# Patient Record
Sex: FEMALE | HISPANIC OR LATINO
[De-identification: names, ages, dates, MRNs, and addresses within clinical notes are randomized per-mention and may not be internally consistent; named-entity substitution may affect disease eponyms.]

---
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
## 2020-05-21 ENCOUNTER — TELEMEDICINE (OUTPATIENT)
Dept: FAMILY MEDICINE CLINIC | Facility: CLINIC | Age: 20
End: 2020-05-21
Payer: COMMERCIAL

## 2020-05-21 DIAGNOSIS — Z30.09 COUNSELING FOR BIRTH CONTROL, ORAL CONTRACEPTIVES: ICD-10-CM

## 2020-05-21 DIAGNOSIS — Z30.011 ENCOUNTER FOR BCP (BIRTH CONTROL PILLS) INITIAL PRESCRIPTION: Primary | ICD-10-CM

## 2020-05-21 PROCEDURE — 99213 OFFICE O/P EST LOW 20 MIN: CPT | Performed by: EMERGENCY MEDICINE

## 2020-05-21 RX ORDER — DROSPIRENONE AND ETHINYL ESTRADIOL 0.02-3(28)
1 KIT ORAL DAILY
Qty: 3 PACKAGE | Refills: 0 | Status: SHIPPED | OUTPATIENT
Start: 2020-05-21 | End: 2020-07-14

## 2020-05-21 NOTE — PROGRESS NOTES
This is a telemedicine visit with live, interactive video and audio. Fidencio Whyte verbally consents to a Virtual/Telephone Check-In visit on 05/21/20.     Patient understands and accepts financial responsibility for any deductible, co-insurance an contraceptives        Patient counseled on use of birth control and she is to avoid smoking. She understands that birth control does not protect her from all STDs.   Discussed risk factors of combined OCP including DVT/stroke, advised not to smoke as it wi

## 2020-05-21 NOTE — PATIENT INSTRUCTIONS
Birth Control Choices  Birth control keeps you from getting pregnant during sex. There are many types of birth control. Some are more effective than others. New types are being tested all the time.  Your healthcare provider can help you decide which typ your healthcare provider about possible risks. Female sterilization can also be done through the belly (laparoscopy) to block the tubes. Or to remove part or all of the tubes. · Withdrawal method. This is when the male doesn't ejaculate into the vagina.  I of birth control. Some are more effective than others. New types are being tested all the time. Your healthcare provider can help you decide which type of birth control is best for you.  But no matter which type you choose, you and your partner must use it (laparoscopy) to block the tubes. Or to remove part or all of the tubes. · Withdrawal method. This is when the male doesn't ejaculate into the vagina. Instead he withdraws his penis just before he ejaculates.  But the failure rate for this method ranges fr

## 2020-07-14 ENCOUNTER — LAB ENCOUNTER (OUTPATIENT)
Dept: LAB | Age: 20
End: 2020-07-14
Attending: NURSE PRACTITIONER
Payer: COMMERCIAL

## 2020-07-14 ENCOUNTER — OFFICE VISIT (OUTPATIENT)
Dept: FAMILY MEDICINE CLINIC | Facility: CLINIC | Age: 20
End: 2020-07-14
Payer: COMMERCIAL

## 2020-07-14 VITALS
BODY MASS INDEX: 20.61 KG/M2 | HEIGHT: 62 IN | OXYGEN SATURATION: 99 % | WEIGHT: 112 LBS | SYSTOLIC BLOOD PRESSURE: 112 MMHG | TEMPERATURE: 98 F | DIASTOLIC BLOOD PRESSURE: 68 MMHG | HEART RATE: 85 BPM | RESPIRATION RATE: 16 BRPM

## 2020-07-14 DIAGNOSIS — Z01.84 IMMUNITY STATUS TESTING: ICD-10-CM

## 2020-07-14 DIAGNOSIS — Z00.00 EXAMINATION, ROUTINE, OVER 18 YEARS OF AGE: Primary | ICD-10-CM

## 2020-07-14 DIAGNOSIS — Z71.3 ENCOUNTER FOR DIETARY COUNSELING AND SURVEILLANCE: ICD-10-CM

## 2020-07-14 DIAGNOSIS — Z30.41 ENCOUNTER FOR BIRTH CONTROL PILLS MAINTENANCE: ICD-10-CM

## 2020-07-14 DIAGNOSIS — Z01.84 IMMUNITY STATUS TESTING: Primary | ICD-10-CM

## 2020-07-14 DIAGNOSIS — Z23 NEED FOR HPV VACCINATION: ICD-10-CM

## 2020-07-14 DIAGNOSIS — Z71.82 EXERCISE COUNSELING: ICD-10-CM

## 2020-07-14 LAB
HBV SURFACE AB SER QL: NONREACTIVE
HBV SURFACE AB SERPL IA-ACNC: <3.1 MIU/ML
RUBV IGG SER QL: POSITIVE
RUBV IGG SER-ACNC: 68.5 IU/ML (ref 10–?)

## 2020-07-14 PROCEDURE — 86762 RUBELLA ANTIBODY: CPT

## 2020-07-14 PROCEDURE — 86480 TB TEST CELL IMMUN MEASURE: CPT

## 2020-07-14 PROCEDURE — 86765 RUBEOLA ANTIBODY: CPT

## 2020-07-14 PROCEDURE — 90651 9VHPV VACCINE 2/3 DOSE IM: CPT | Performed by: NURSE PRACTITIONER

## 2020-07-14 PROCEDURE — 86735 MUMPS ANTIBODY: CPT

## 2020-07-14 PROCEDURE — 86706 HEP B SURFACE ANTIBODY: CPT

## 2020-07-14 PROCEDURE — 90471 IMMUNIZATION ADMIN: CPT | Performed by: NURSE PRACTITIONER

## 2020-07-14 PROCEDURE — 36415 COLL VENOUS BLD VENIPUNCTURE: CPT

## 2020-07-14 PROCEDURE — 86787 VARICELLA-ZOSTER ANTIBODY: CPT

## 2020-07-14 PROCEDURE — 99395 PREV VISIT EST AGE 18-39: CPT | Performed by: NURSE PRACTITIONER

## 2020-07-14 RX ORDER — DROSPIRENONE AND ETHINYL ESTRADIOL 0.02-3(28)
1 KIT ORAL DAILY
Qty: 3 PACKAGE | Refills: 0 | Status: SHIPPED | OUTPATIENT
Start: 2020-07-14 | End: 2020-09-29

## 2020-07-14 NOTE — PROGRESS NOTES
Syeda Morejon is a 21year old female who was brought in for her  Physical visit.   Subjective      History was provided by patient  HPI:   Patient presents for:  Patient presents with:  Physical    Patient presenting for college physical- attends [employer] Tobacco/Alcohol/drugs/sexual activity: Yes, sexually active. Uses condoms and OCP.      Review of Systems:  HEENT:   no eye/vision concerns, no ear/hearing concerns and no cold symptoms  Respiratory:    no cough  and no shortness of breath  Cardiovascul bruising  Back/Spine: no abnormalities, no scoliosis and hip height equal  Musculoskeletal: no deformities, full ROM of all extremities, normal strength, strength equal upper and lower extremities bilaterally, normal gait  Extremities: no deformities, no c (Gardasil 9) (16858)    07/14/20  SUBHA Acharya, FNP-C

## 2020-07-15 ENCOUNTER — TELEPHONE (OUTPATIENT)
Dept: FAMILY MEDICINE CLINIC | Facility: CLINIC | Age: 20
End: 2020-07-15

## 2020-07-15 LAB
MEV IGG SER-ACNC: 38.1 AU/ML (ref 16.5–?)
MUV IGG SER IA-ACNC: 66.4 AU/ML (ref 11–?)

## 2020-07-15 NOTE — TELEPHONE ENCOUNTER
lmom for pt with results/instructions. Asked pt to call office to schedule with the nurse her first Hep B.

## 2020-07-15 NOTE — TELEPHONE ENCOUNTER
----- Message from SUBHA Gonzales FNP-C sent at 7/15/2020  8:04 AM CDT -----  Hep B non-reactive. She has no protective immunity. 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 Immune to rubella. Other labs still in process.

## 2020-07-16 LAB
M TB IFN-G CD4+ T-CELLS BLD-ACNC: 0.04 IU/ML
M TB TUBERC IFN-G BLD QL: NEGATIVE
M TB TUBERC IGNF/MITOGEN IGNF CONTROL: 9.74 IU/ML
QUANTIFERON TB1 MINUS NIL: 0.01 IU/ML
QUANTIFERON TB2 MINUS NIL: 0.01 IU/ML
VZV IGG SER IA-ACNC: 161.8 (ref 165–?)

## 2020-07-17 ENCOUNTER — TELEPHONE (OUTPATIENT)
Dept: FAMILY MEDICINE CLINIC | Facility: CLINIC | Age: 20
End: 2020-07-17

## 2020-07-17 DIAGNOSIS — Z01.84 IMMUNITY STATUS TESTING: Primary | ICD-10-CM

## 2020-07-17 NOTE — TELEPHONE ENCOUNTER
Spoke with patient regarding results and recommendations below. Patient verbalizes understanding. I ordered the repeat varicella titer for 2 weeks from now. She will call back to schedule Hep b series.     Eduard Parks - She just wanted to follow-up and make

## 2020-07-17 NOTE — TELEPHONE ENCOUNTER
----- Message from SUBHA Montano FNP-C sent at 7/15/2020  1:39 PM CDT -----  + Immunity to measles and mumps.

## 2020-07-17 NOTE — TELEPHONE ENCOUNTER
SUBHA Nails FNP-C  7/17/2020  7:50 AM      Please have patient repeat varicella titers in 2 weeks. Unable to determine immunity- results \"equivocal.\"     SUBHA Nails FNP-C  7/16/2020  1:35 PM      Negative TB.  Still waiting on varicella

## 2020-07-17 NOTE — TELEPHONE ENCOUNTER
[facility] Does she need it back now? Otherwise we can wait for her tilters.

## 2020-07-21 NOTE — TELEPHONE ENCOUNTER
Patient came in today hoping the form is ready for . Pt needs this form to uploaded and sent to her university by 12am 7/31. Pt is asking if the form could be signed without the titer repeat results. Please advise and let the patient know.

## 2020-07-22 ENCOUNTER — TELEPHONE (OUTPATIENT)
Dept: FAMILY MEDICINE CLINIC | Facility: CLINIC | Age: 20
End: 2020-07-22

## 2020-07-22 NOTE — TELEPHONE ENCOUNTER
I can complete the form, but would still recommend she repeat varicella titer. Form completed and signed. MA to notify patient.

## 2020-07-23 DIAGNOSIS — Z78.9 PARTICIPANT IN HEALTH AND WELLNESS PLAN: Primary | ICD-10-CM

## 2020-08-17 ENCOUNTER — TELEPHONE (OUTPATIENT)
Dept: INTERNAL MEDICINE CLINIC | Facility: HOSPITAL | Age: 20
End: 2020-08-17

## 2020-08-17 DIAGNOSIS — Z20.822 SUSPECTED 2019 NOVEL CORONAVIRUS INFECTION: Primary | ICD-10-CM

## 2020-08-19 ENCOUNTER — LAB ENCOUNTER (OUTPATIENT)
Dept: LAB | Facility: HOSPITAL | Age: 20
End: 2020-08-19
Attending: PREVENTIVE MEDICINE
Payer: COMMERCIAL

## 2020-08-19 DIAGNOSIS — Z20.822 SUSPECTED 2019 NOVEL CORONAVIRUS INFECTION: ICD-10-CM

## 2020-08-19 LAB — SARS-COV-2 RNA RESP QL NAA+PROBE: NOT DETECTED

## 2020-09-29 ENCOUNTER — IMMUNIZATION (OUTPATIENT)
Dept: FAMILY MEDICINE CLINIC | Facility: CLINIC | Age: 20
End: 2020-09-29
Payer: COMMERCIAL

## 2020-09-29 DIAGNOSIS — Z30.41 ENCOUNTER FOR BIRTH CONTROL PILLS MAINTENANCE: ICD-10-CM

## 2020-09-29 PROBLEM — F41.1 GENERALIZED ANXIETY DISORDER: Status: ACTIVE | Noted: 2020-09-29

## 2020-09-29 PROBLEM — F33.1 MAJOR DEPRESSIVE DISORDER, RECURRENT EPISODE, MODERATE (HCC): Status: ACTIVE | Noted: 2020-09-29

## 2020-09-29 PROCEDURE — 90471 IMMUNIZATION ADMIN: CPT | Performed by: NURSE PRACTITIONER

## 2020-09-29 PROCEDURE — 90686 IIV4 VACC NO PRSV 0.5 ML IM: CPT | Performed by: NURSE PRACTITIONER

## 2020-09-30 NOTE — TELEPHONE ENCOUNTER
Medication(s) to Refill:   Requested Prescriptions     Pending Prescriptions Disp Refills   • Drospirenone-Ethinyl Estradiol 3-0.02 MG Oral Tab 3 Package 0     Sig: Take 1 tablet by mouth daily.          Reason for Medication Refill being sent to Provider /

## 2020-10-01 RX ORDER — DROSPIRENONE AND ETHINYL ESTRADIOL 0.02-3(28)
1 KIT ORAL DAILY
Qty: 3 PACKAGE | Refills: 3 | Status: SHIPPED | OUTPATIENT
Start: 2020-10-01 | End: 2020-10-14

## 2020-10-14 ENCOUNTER — TELEPHONE (OUTPATIENT)
Dept: FAMILY MEDICINE CLINIC | Facility: CLINIC | Age: 20
End: 2020-10-14

## 2020-10-14 DIAGNOSIS — Z30.41 ENCOUNTER FOR BIRTH CONTROL PILLS MAINTENANCE: ICD-10-CM

## 2020-10-14 RX ORDER — DROSPIRENONE AND ETHINYL ESTRADIOL 0.02-3(28)
1 KIT ORAL DAILY
Qty: 3 PACKAGE | Refills: 3 | Status: SHIPPED | OUTPATIENT
Start: 2020-10-14 | End: 2021-01-09

## 2020-10-14 NOTE — TELEPHONE ENCOUNTER
Pt had a change in pharmacy and needs her b/c pills sent to [facility] in [place] instead. [facility] listed.

## 2020-11-02 ENCOUNTER — TELEPHONE (OUTPATIENT)
Dept: FAMILY MEDICINE CLINIC | Facility: CLINIC | Age: 20
End: 2020-11-02

## 2020-11-02 NOTE — TELEPHONE ENCOUNTER
Pt Mom called, she is having oral surgery tomorrow. She is requesting a note to excuse her from work & school for one week. Mom states the dentist deferred giving her a note until she had the surgery. Please advise, thank you!

## 2020-11-03 NOTE — TELEPHONE ENCOUNTER
Pt contacted the WIC/IC triage line to check on status of note. Informed her of Dr. Baljinder Garrido recommendations as written below. Pt verbalizes understanding.

## 2020-11-03 NOTE — TELEPHONE ENCOUNTER
Work restrictions as per dentist. Note for being off work should be from the dentist since dentist is managing the condition

## 2020-11-04 ENCOUNTER — TELEPHONE (OUTPATIENT)
Dept: INTERNAL MEDICINE CLINIC | Facility: HOSPITAL | Age: 20
End: 2020-11-04

## 2020-11-04 ENCOUNTER — LAB ENCOUNTER (OUTPATIENT)
Dept: LAB | Age: 20
End: 2020-11-04
Attending: PREVENTIVE MEDICINE
Payer: COMMERCIAL

## 2020-11-04 DIAGNOSIS — Z20.822 SUSPECTED 2019 NOVEL CORONAVIRUS INFECTION: ICD-10-CM

## 2020-11-04 DIAGNOSIS — Z20.822 SUSPECTED 2019 NOVEL CORONAVIRUS INFECTION: Primary | ICD-10-CM

## 2020-11-19 PROBLEM — F41.0 PANIC DISORDER (EPISODIC PAROXYSMAL ANXIETY): Status: ACTIVE | Noted: 2020-11-19

## 2020-12-20 ENCOUNTER — IMMUNIZATION (OUTPATIENT)
Dept: LAB | Facility: HOSPITAL | Age: 20
End: 2020-12-20
Attending: PREVENTIVE MEDICINE
Payer: COMMERCIAL

## 2020-12-20 DIAGNOSIS — Z23 NEED FOR VACCINATION: ICD-10-CM

## 2020-12-20 PROCEDURE — 0001A PFIZER-BIONTECH COVID-19 VACCINE: CPT

## 2021-01-09 DIAGNOSIS — Z30.41 ENCOUNTER FOR BIRTH CONTROL PILLS MAINTENANCE: ICD-10-CM

## 2021-01-10 ENCOUNTER — IMMUNIZATION (OUTPATIENT)
Dept: LAB | Facility: HOSPITAL | Age: 21
End: 2021-01-10
Attending: PREVENTIVE MEDICINE

## 2021-01-10 DIAGNOSIS — Z23 NEED FOR VACCINATION: ICD-10-CM

## 2021-01-10 PROCEDURE — 0002A SARSCOV2 VAC 30MCG/0.3ML IM: CPT

## 2021-01-11 RX ORDER — DROSPIRENONE AND ETHINYL ESTRADIOL 0.02-3(28)
1 KIT ORAL DAILY
Qty: 3 PACKAGE | Refills: 3 | Status: SHIPPED | OUTPATIENT
Start: 2021-01-11 | End: 2021-03-28

## 2021-03-28 DIAGNOSIS — Z30.41 ENCOUNTER FOR BIRTH CONTROL PILLS MAINTENANCE: ICD-10-CM

## 2021-03-29 NOTE — TELEPHONE ENCOUNTER
Medication(s) to Refill:   Requested Prescriptions     Pending Prescriptions Disp Refills   • Drospirenone-Ethinyl Estradiol 3-0.02 MG Oral Tab 3 Package 3     Sig: Take 1 tablet by mouth daily.          Reason for Medication Refill being sent to Provider /

## 2021-03-31 RX ORDER — DROSPIRENONE AND ETHINYL ESTRADIOL 0.02-3(28)
1 KIT ORAL DAILY
Qty: 3 PACKAGE | Refills: 0 | Status: SHIPPED | OUTPATIENT
Start: 2021-03-31 | End: 2021-09-22

## 2021-04-09 DIAGNOSIS — Z23 NEED FOR VACCINATION: ICD-10-CM

## 2021-04-12 ENCOUNTER — LAB ENCOUNTER (OUTPATIENT)
Dept: LAB | Age: 21
End: 2021-04-12
Attending: FAMILY MEDICINE

## 2021-04-12 ENCOUNTER — TELEMEDICINE (OUTPATIENT)
Dept: FAMILY MEDICINE CLINIC | Facility: CLINIC | Age: 21
End: 2021-04-12

## 2021-04-12 ENCOUNTER — LAB ENCOUNTER (OUTPATIENT)
Dept: LAB | Age: 21
End: 2021-04-12
Attending: EMERGENCY MEDICINE
Payer: COMMERCIAL

## 2021-04-12 DIAGNOSIS — J06.9 UPPER RESPIRATORY TRACT INFECTION, UNSPECIFIED TYPE: Primary | ICD-10-CM

## 2021-04-12 DIAGNOSIS — J06.9 UPPER RESPIRATORY TRACT INFECTION, UNSPECIFIED TYPE: ICD-10-CM

## 2021-04-12 DIAGNOSIS — Z20.822 SUSPECTED COVID-19 VIRUS INFECTION: ICD-10-CM

## 2021-04-12 PROCEDURE — 99214 OFFICE O/P EST MOD 30 MIN: CPT | Performed by: FAMILY MEDICINE

## 2021-04-12 PROCEDURE — 87081 CULTURE SCREEN ONLY: CPT

## 2021-04-12 RX ORDER — BENZONATATE 100 MG/1
100 CAPSULE ORAL 3 TIMES DAILY PRN
Qty: 30 CAPSULE | Refills: 0 | Status: SHIPPED | OUTPATIENT
Start: 2021-04-12 | End: 2021-10-07 | Stop reason: ALTCHOICE

## 2021-04-12 NOTE — PROGRESS NOTES
Virtual Telephone Check-In    Mac Zhong verbally consents to a Virtual/Telephone Check-In visit on 04/12/21. Patient has been referred to the [facility] website at [email or web address] to review the yearly Consent to Treat document.     Patient unde honey, salt water gargles to help alleviate throat pain. Will obtain throat culture given h/o strep pharyngitis.  Reviewed ER precautions - advised to present to ED for any intractable fevers, dyspnea, chest pain etc. Advised to monitor vitals at home inclu

## 2021-04-16 ENCOUNTER — TELEMEDICINE (OUTPATIENT)
Dept: FAMILY MEDICINE CLINIC | Facility: CLINIC | Age: 21
End: 2021-04-16

## 2021-04-16 DIAGNOSIS — J06.9 UPPER RESPIRATORY TRACT INFECTION, UNSPECIFIED TYPE: Primary | ICD-10-CM

## 2021-04-16 PROCEDURE — 99214 OFFICE O/P EST MOD 30 MIN: CPT | Performed by: FAMILY MEDICINE

## 2021-04-16 RX ORDER — AZITHROMYCIN 250 MG/1
TABLET, FILM COATED ORAL
Qty: 6 TABLET | Refills: 0 | Status: SHIPPED | OUTPATIENT
Start: 2021-04-16 | End: 2021-04-21

## 2021-04-16 NOTE — PROGRESS NOTES
Virtual Telephone Check-In    Ivonne Acosta verbally consents to a Virtual/Telephone Check-In visit on 04/16/21. Patient has been referred to the [facility] website at [email or web address] to review the yearly Consent to Treat document.     Patient unde cough/cold medication as needed. Continue to push fluids. Nasal saline spray/aye pot/humidifer prn congestion. Reviewed ED and return precautions.   -Follow up in 1 week PRN.        Nick Herman MD

## 2021-04-19 ENCOUNTER — TELEMEDICINE (OUTPATIENT)
Dept: FAMILY MEDICINE CLINIC | Facility: CLINIC | Age: 21
End: 2021-04-19

## 2021-04-19 ENCOUNTER — HOSPITAL ENCOUNTER (OUTPATIENT)
Dept: GENERAL RADIOLOGY | Age: 21
Discharge: HOME | End: 2021-04-19
Attending: FAMILY MEDICINE
Payer: COMMERCIAL

## 2021-04-19 DIAGNOSIS — J06.9 UPPER RESPIRATORY TRACT INFECTION, UNSPECIFIED TYPE: Primary | ICD-10-CM

## 2021-04-19 DIAGNOSIS — J06.9 UPPER RESPIRATORY TRACT INFECTION, UNSPECIFIED TYPE: ICD-10-CM

## 2021-04-19 PROCEDURE — 71046 X-RAY EXAM CHEST 2 VIEWS: CPT | Performed by: FAMILY MEDICINE

## 2021-04-19 PROCEDURE — 99213 OFFICE O/P EST LOW 20 MIN: CPT | Performed by: FAMILY MEDICINE

## 2021-04-19 RX ORDER — GUAIFENESIN 600 MG
1200 TABLET, EXTENDED RELEASE 12 HR ORAL 2 TIMES DAILY
Qty: 30 TABLET | Refills: 0 | Status: SHIPPED | OUTPATIENT
Start: 2021-04-19 | End: 2021-10-07 | Stop reason: ALTCHOICE

## 2021-04-19 NOTE — PROGRESS NOTES
Virtual Telephone Check-In    Roseanne Membreno verbally consents to a Virtual/Telephone Check-In visit on 04/19/21. Patient has been referred to the [facility] website at [email or web address] to review the yearly Consent to Treat document.     Patient unde

## 2021-04-20 ENCOUNTER — TELEPHONE (OUTPATIENT)
Dept: FAMILY MEDICINE CLINIC | Facility: CLINIC | Age: 21
End: 2021-04-20

## 2021-04-20 NOTE — TELEPHONE ENCOUNTER
----- Message from Carey Lucas MD sent at 4/19/2021  2:05 PM CDT -----  Normal CXR - no signs of any acute pathology. Continue zpak as we previously discussed.

## 2021-04-22 ENCOUNTER — PATIENT MESSAGE (OUTPATIENT)
Dept: FAMILY MEDICINE CLINIC | Facility: CLINIC | Age: 21
End: 2021-04-22

## 2021-04-22 ENCOUNTER — HOSPITAL ENCOUNTER (OUTPATIENT)
Dept: CT IMAGING | Facility: HOSPITAL | Age: 21
Discharge: HOME | End: 2021-04-22
Attending: FAMILY MEDICINE
Payer: COMMERCIAL

## 2021-04-22 DIAGNOSIS — R07.81 PLEURITIC CHEST PAIN: ICD-10-CM

## 2021-04-22 DIAGNOSIS — R07.81 PLEURITIC CHEST PAIN: Primary | ICD-10-CM

## 2021-04-22 PROCEDURE — 82565 ASSAY OF CREATININE: CPT

## 2021-04-22 PROCEDURE — 71275 CT ANGIOGRAPHY CHEST: CPT | Performed by: FAMILY MEDICINE

## 2021-04-22 NOTE — TELEPHONE ENCOUNTER
From: Suraj Screen  To: Burton Azul MD  Sent: 4/22/2021 10:14 AM CDT  Subject: Visit Follow-up Question    Good morning,   I am still experiencing painful pleuritic pain when I breathe.  I know the chest x-ray was normal, but is there anythi

## 2021-04-22 NOTE — TELEPHONE ENCOUNTER
Will need evaluation for PE - please schedule for stat chest CTA. Will follow up once I get the results. Has she started the azithromycin? Has she taken any NSAIDs or tylenol?

## 2021-04-22 NOTE — TELEPHONE ENCOUNTER
Please see above message. Patient continues to have pain and is requesting to know what can be causing this. Patient is also needing a note for school d/t the pain she is not attending in-person class. Please advise. Thank you!

## 2021-04-22 NOTE — TELEPHONE ENCOUNTER
Regarding: FW: Visit Follow-up Question  Contact: [phone]      ----- Message -----  From: Jemma Damon RN  Sent: 4/22/2021  10:21 AM CDT  To: Ayana Nelson MD  Subject: Visit Follow-up Question                         ----- Message from [place]

## 2021-04-22 NOTE — TELEPHONE ENCOUNTER
Spoke to pt with orders. Pt states she did start the antibiotic and has been alternating between ibuprofen and tylenol. 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   Explained that the CTA will need to get approval first so will

## 2021-04-23 ENCOUNTER — TELEPHONE (OUTPATIENT)
Dept: FAMILY MEDICINE CLINIC | Facility: CLINIC | Age: 21
End: 2021-04-23

## 2021-04-23 NOTE — TELEPHONE ENCOUNTER
----- Message from Steve Nava MD sent at 4/22/2021  4:34 PM CDT -----  CTA negative for any acute PE. So far we have normal xray and CTA therefore no PE, pneumonia, or pneumothorax that would be contributing to her symptoms.  Suspect that this is

## 2021-07-27 ENCOUNTER — NURSE ONLY (OUTPATIENT)
Dept: FAMILY MEDICINE CLINIC | Facility: CLINIC | Age: 21
End: 2021-07-27
Payer: COMMERCIAL

## 2021-07-27 DIAGNOSIS — Z23 NEED FOR TDAP VACCINATION: ICD-10-CM

## 2021-07-27 DIAGNOSIS — Z11.1 SCREENING FOR TUBERCULOSIS: Primary | ICD-10-CM

## 2021-07-27 PROCEDURE — 90715 TDAP VACCINE 7 YRS/> IM: CPT | Performed by: PHYSICIAN ASSISTANT

## 2021-07-27 PROCEDURE — 90471 IMMUNIZATION ADMIN: CPT | Performed by: PHYSICIAN ASSISTANT

## 2021-07-27 PROCEDURE — 86580 TB INTRADERMAL TEST: CPT | Performed by: PHYSICIAN ASSISTANT

## 2021-07-29 ENCOUNTER — OFFICE VISIT (OUTPATIENT)
Dept: FAMILY MEDICINE CLINIC | Facility: CLINIC | Age: 21
End: 2021-07-29
Payer: COMMERCIAL

## 2021-07-29 DIAGNOSIS — Z11.1 SCREENING FOR TUBERCULOSIS: Primary | ICD-10-CM

## 2021-08-14 ENCOUNTER — HOSPITAL ENCOUNTER (OUTPATIENT)
Age: 21
Discharge: HOME | End: 2021-08-14
Payer: COMMERCIAL

## 2021-08-14 VITALS
RESPIRATION RATE: 20 BRPM | OXYGEN SATURATION: 98 % | SYSTOLIC BLOOD PRESSURE: 125 MMHG | WEIGHT: 130 LBS | TEMPERATURE: 98 F | DIASTOLIC BLOOD PRESSURE: 83 MMHG | HEART RATE: 88 BPM | BODY MASS INDEX: 23.04 KG/M2 | HEIGHT: 63 IN

## 2021-08-14 DIAGNOSIS — N76.0 BACTERIAL VAGINOSIS: ICD-10-CM

## 2021-08-14 DIAGNOSIS — Z20.2 STD EXPOSURE: Primary | ICD-10-CM

## 2021-08-14 DIAGNOSIS — B37.3 VAGINAL CANDIDIASIS: ICD-10-CM

## 2021-08-14 DIAGNOSIS — B96.89 BACTERIAL VAGINOSIS: ICD-10-CM

## 2021-08-14 LAB
B-HCG UR QL: NEGATIVE
POCT BILIRUBIN URINE: NEGATIVE
POCT BLOOD URINE: NEGATIVE
POCT GLUCOSE URINE: NEGATIVE MG/DL
POCT LEUKOCYTE ESTERASE URINE: NEGATIVE
POCT NITRITE URINE: NEGATIVE
POCT PH URINE: 5.5 (ref 5–8)
POCT PROTEIN URINE: NEGATIVE MG/DL
POCT SPECIFIC GRAVITY URINE: 1.03
POCT URINE CLARITY: CLEAR
POCT URINE COLOR: YELLOW
POCT UROBILINOGEN URINE: 0.2 MG/DL

## 2021-08-14 PROCEDURE — 99214 OFFICE O/P EST MOD 30 MIN: CPT

## 2021-08-14 PROCEDURE — 87660 TRICHOMONAS VAGIN DIR PROBE: CPT | Performed by: PHYSICIAN ASSISTANT

## 2021-08-14 PROCEDURE — 87591 N.GONORRHOEAE DNA AMP PROB: CPT | Performed by: PHYSICIAN ASSISTANT

## 2021-08-14 PROCEDURE — 81002 URINALYSIS NONAUTO W/O SCOPE: CPT

## 2021-08-14 PROCEDURE — 87491 CHLMYD TRACH DNA AMP PROBE: CPT | Performed by: PHYSICIAN ASSISTANT

## 2021-08-14 PROCEDURE — 87510 GARDNER VAG DNA DIR PROBE: CPT | Performed by: PHYSICIAN ASSISTANT

## 2021-08-14 PROCEDURE — 81025 URINE PREGNANCY TEST: CPT

## 2021-08-14 PROCEDURE — 87480 CANDIDA DNA DIR PROBE: CPT | Performed by: PHYSICIAN ASSISTANT

## 2021-08-14 RX ORDER — FLUCONAZOLE 150 MG/1
150 TABLET ORAL ONCE
Qty: 1 TABLET | Refills: 0 | Status: SHIPPED | OUTPATIENT
Start: 2021-08-14 | End: 2021-08-14

## 2021-08-14 RX ORDER — AZITHROMYCIN 250 MG/1
1000 TABLET, FILM COATED ORAL ONCE
Status: COMPLETED | OUTPATIENT
Start: 2021-08-14 | End: 2021-08-14

## 2021-08-14 NOTE — ED PROVIDER NOTES
Patient Seen in: [facility]      History   Patient presents with:  Eval-G    Stated Complaint: std testing    HPI/Subjective:   HPI    CHIEF COMPLAINT: Exposure to chlamydia     HISTORY OF PRESENT ILLNESS: Patient is a 57-year-old female above.    Physical Exam     ED Triage Vitals [08/14/21 1226]   /83   Pulse 88   Resp 20   Temp 97.8 °F (36.6 °C)   Temp src Temporal   SpO2 98 %   O2 Device None (Room air)       Current:/83   Pulse 88   Temp 97.8 °F (36.6 °C) (Temporal)   Resp Diflucan. Vaginitis DNA probe was sent and is pending at this time. We will amend treatment if indicated by results. Reviewed reducing the risk of sexually transmitted infections by using condoms. Follow with primary care.   If there are any new, yecenia

## 2021-08-15 RX ORDER — METRONIDAZOLE 7.5 MG/G
1 GEL VAGINAL NIGHTLY
Qty: 70 G | Refills: 0 | Status: SHIPPED | OUTPATIENT
Start: 2021-08-15 | End: 2021-10-07 | Stop reason: ALTCHOICE

## 2021-08-15 NOTE — ED NOTES
Attempted to reach pt to inform her that she is + for BV. Metrogel sent to pharmacy for her. No answer and no identifier. Left message for pt to call us back.

## 2021-08-15 NOTE — PROGRESS NOTES
Please review results. Pt was treated with Diflucan for the yeast but no treatment for BV. Thank you.

## 2021-08-15 NOTE — PROGRESS NOTES
Spoke with pt and notified her of positive BV results. Informed pt that Metrogel has been sent over to  her pharmacy. Pt verbalized an understanding.

## 2021-08-16 LAB
C TRACH DNA SPEC QL NAA+PROBE: POSITIVE
N GONORRHOEA DNA SPEC QL NAA+PROBE: NEGATIVE

## 2021-09-09 ENCOUNTER — TELEPHONE (OUTPATIENT)
Dept: FAMILY MEDICINE CLINIC | Facility: CLINIC | Age: 21
End: 2021-09-09

## 2021-09-22 DIAGNOSIS — Z30.41 ENCOUNTER FOR BIRTH CONTROL PILLS MAINTENANCE: ICD-10-CM

## 2021-09-22 RX ORDER — DROSPIRENONE AND ETHINYL ESTRADIOL 0.02-3(28)
1 KIT ORAL DAILY
Qty: 28 TABLET | Refills: 0 | Status: SHIPPED | OUTPATIENT
Start: 2021-09-22 | End: 2021-11-02 | Stop reason: ALTCHOICE

## 2021-10-07 ENCOUNTER — OFFICE VISIT (OUTPATIENT)
Dept: FAMILY MEDICINE CLINIC | Facility: CLINIC | Age: 21
End: 2021-10-07
Payer: COMMERCIAL

## 2021-10-07 VITALS
WEIGHT: 128.81 LBS | OXYGEN SATURATION: 98 % | DIASTOLIC BLOOD PRESSURE: 68 MMHG | HEART RATE: 78 BPM | BODY MASS INDEX: 22.82 KG/M2 | SYSTOLIC BLOOD PRESSURE: 116 MMHG | HEIGHT: 63 IN | RESPIRATION RATE: 16 BRPM

## 2021-10-07 DIAGNOSIS — Z23 NEED FOR VACCINATION: ICD-10-CM

## 2021-10-07 DIAGNOSIS — L70.0 ACNE VULGARIS: Primary | ICD-10-CM

## 2021-10-07 PROCEDURE — 90686 IIV4 VACC NO PRSV 0.5 ML IM: CPT | Performed by: EMERGENCY MEDICINE

## 2021-10-07 PROCEDURE — 3074F SYST BP LT 130 MM HG: CPT | Performed by: EMERGENCY MEDICINE

## 2021-10-07 PROCEDURE — 90471 IMMUNIZATION ADMIN: CPT | Performed by: EMERGENCY MEDICINE

## 2021-10-07 PROCEDURE — 3008F BODY MASS INDEX DOCD: CPT | Performed by: EMERGENCY MEDICINE

## 2021-10-07 PROCEDURE — 3078F DIAST BP <80 MM HG: CPT | Performed by: EMERGENCY MEDICINE

## 2021-10-07 PROCEDURE — 99213 OFFICE O/P EST LOW 20 MIN: CPT | Performed by: EMERGENCY MEDICINE

## 2021-10-07 RX ORDER — CLINDAMYCIN PHOSPHATE 10 MG/ML
2 LOTION TOPICAL DAILY
Qty: 60 ML | Refills: 1 | Status: SHIPPED | OUTPATIENT
Start: 2021-10-07 | End: 2021-11-02 | Stop reason: ALTCHOICE

## 2021-10-07 RX ORDER — TRETINOIN 0.025 %
1 CREAM (GRAM) TOPICAL NIGHTLY
Qty: 45 G | Refills: 1 | Status: SHIPPED | OUTPATIENT
Start: 2021-10-07 | End: 2021-11-02 | Stop reason: ALTCHOICE

## 2021-10-07 NOTE — PATIENT INSTRUCTIONS
Thank you for choosing [address] [place] [address] [facility]  To Do:  FOR ANSHU ROBERTSON        1. Have pending blood tests done  2. Use tretinoin cream once a day  3. Use clindamycin lotion once a day  4. Minimize make up  5. Wash face daily  6.  Follow up in 1 month f your skin. Apply the medicine to all areas where you get acne. Don’t just treat acne you can see now. New blemishes may be in progress but not in view yet. Treat all the skin. · Don’t squeeze or pick blemishes. Acne blemishes may heal on their own.  But sq Women with certain conditions such as polycystic ovarian syndrome (PCOS) may make too much male hormones. Acne in women often may happen just before their menstrual periods.  If you had acne when you were a teenager or if others in your family have had acne provider may also remove blemishes or give you injections. If you have acne scars, you may need surgery or medicines to help improve the way your skin looks. Be sure you understand your treatment plan and any side effects it might cause.  You will play an i Margie last reviewed this educational content on 7/1/2019  © 0249-8568 [employer]. All rights reserved. This information is not intended as a substitute for professional medical care.  Always follow your healthcare professional's instruct

## 2021-10-07 NOTE — PROGRESS NOTES
Chief Complaint:   Patient presents with: Follow - Up: Acne   Follow - Up: STD    HPI:   This is a 24year old female       ACNE    C/O acne. Waxing and waning the past 5 months.  No new medications,   No new medcaitons  No new cosmetics or lotion  Has n review of systems is negative except those stated as above    PHYSICAL EXAM:   /68   Pulse 78   Resp 16   Ht 5' 3\" (1.6 m)   Wt 128 lb 12.8 oz (58.4 kg)   LMP 09/06/2021 (Approximate)   SpO2 98%   BMI 22.82 kg/m²  Estimated body mass index is 22.82

## 2021-10-21 ENCOUNTER — APPOINTMENT (OUTPATIENT)
Dept: GENERAL RADIOLOGY | Age: 21
End: 2021-10-21
Attending: EMERGENCY MEDICINE
Payer: COMMERCIAL

## 2021-10-21 ENCOUNTER — HOSPITAL ENCOUNTER (OUTPATIENT)
Age: 21
Discharge: HOME | End: 2021-10-21
Attending: EMERGENCY MEDICINE
Payer: COMMERCIAL

## 2021-10-21 VITALS
OXYGEN SATURATION: 99 % | TEMPERATURE: 98 F | DIASTOLIC BLOOD PRESSURE: 63 MMHG | WEIGHT: 130 LBS | HEIGHT: 63 IN | BODY MASS INDEX: 23.04 KG/M2 | SYSTOLIC BLOOD PRESSURE: 121 MMHG | RESPIRATION RATE: 20 BRPM | HEART RATE: 101 BPM

## 2021-10-21 DIAGNOSIS — J40 BRONCHITIS: Primary | ICD-10-CM

## 2021-10-21 DIAGNOSIS — J98.01 ACUTE BRONCHOSPASM: ICD-10-CM

## 2021-10-21 PROCEDURE — 94640 AIRWAY INHALATION TREATMENT: CPT

## 2021-10-21 PROCEDURE — 99214 OFFICE O/P EST MOD 30 MIN: CPT

## 2021-10-21 PROCEDURE — 71046 X-RAY EXAM CHEST 2 VIEWS: CPT | Performed by: EMERGENCY MEDICINE

## 2021-10-21 RX ORDER — ALBUTEROL SULFATE 90 UG/1
2 AEROSOL, METERED RESPIRATORY (INHALATION) EVERY 4 HOURS PRN
Qty: 1 EACH | Refills: 0 | Status: SHIPPED | OUTPATIENT
Start: 2021-10-21 | End: 2021-11-20

## 2021-10-21 RX ORDER — PREDNISONE 20 MG/1
40 TABLET ORAL DAILY
Qty: 10 TABLET | Refills: 0 | Status: SHIPPED | OUTPATIENT
Start: 2021-10-21 | End: 2021-10-26

## 2021-10-21 RX ORDER — PREDNISONE 20 MG/1
40 TABLET ORAL ONCE
Status: COMPLETED | OUTPATIENT
Start: 2021-10-21 | End: 2021-10-21

## 2021-10-21 RX ORDER — ALBUTEROL SULFATE 2.5 MG/3ML
2.5 SOLUTION RESPIRATORY (INHALATION) ONCE
Status: COMPLETED | OUTPATIENT
Start: 2021-10-21 | End: 2021-10-21

## 2021-10-21 NOTE — ED PROVIDER NOTES
Patient Seen in: [facility] [place]      History   Patient presents with:  Cough    Stated Complaint: cough,wheezing,short of breath    Subjective:   HPI    42-year-old woman who has been vaccinated for Covid and comes in with cough and wheezing ED Course     Labs Reviewed   RAPID SARS-COV-2 BY PCR - Normal          XR CHEST PA + LAT CHEST (CPT=71046)    Result Date: 10/21/2021  PROCEDURE:  XR CHEST PA + LAT CHEST (CPT=71046)  INDICATIONS:  cough,wheezing,short of breath  COMPARISON:  [facility] Disposition:  Discharge  10/21/2021  9:44 am    Follow-up:  Dorota Valenzuela, [address] [facility]    In 3 days  As needed          Medications Prescribed:  Discharge Medication List as of 10/21/2021  9:46 AM    STAR

## 2021-10-21 NOTE — ED INITIAL ASSESSMENT (HPI)
Patient states since last night- dry reactive non productive cough  Wheezing with exertion and conversation  Sinus pressure and congestion  Denies any fever

## 2021-10-28 ENCOUNTER — TELEPHONE (OUTPATIENT)
Dept: FAMILY MEDICINE CLINIC | Facility: CLINIC | Age: 21
End: 2021-10-28

## 2021-10-28 NOTE — TELEPHONE ENCOUNTER
Pt was seen at [facility] on 10/21 for cough. Pt still isn't feeling well and missed school today so she is requesting a note. LOV here 10/7. Has appt scheduled w/ you on 11/1. Please advise, thanks.

## 2021-10-28 NOTE — TELEPHONE ENCOUNTER
Pt is requesting a dr note for missing school today. Pt was seen in ic on 10//21. Pt still isn't doing any better.

## 2021-11-02 ENCOUNTER — OFFICE VISIT (OUTPATIENT)
Dept: FAMILY MEDICINE CLINIC | Facility: CLINIC | Age: 21
End: 2021-11-02
Payer: COMMERCIAL

## 2021-11-02 ENCOUNTER — LAB ENCOUNTER (OUTPATIENT)
Dept: LAB | Age: 21
End: 2021-11-02
Attending: NURSE PRACTITIONER
Payer: COMMERCIAL

## 2021-11-02 VITALS
HEIGHT: 63 IN | HEART RATE: 84 BPM | OXYGEN SATURATION: 98 % | DIASTOLIC BLOOD PRESSURE: 60 MMHG | SYSTOLIC BLOOD PRESSURE: 96 MMHG | RESPIRATION RATE: 16 BRPM | BODY MASS INDEX: 23.21 KG/M2 | WEIGHT: 131 LBS

## 2021-11-02 DIAGNOSIS — Z51.81 ENCOUNTER FOR MEDICATION MONITORING: ICD-10-CM

## 2021-11-02 DIAGNOSIS — R06.02 SHORTNESS OF BREATH: ICD-10-CM

## 2021-11-02 DIAGNOSIS — R05.9 COUGH: ICD-10-CM

## 2021-11-02 DIAGNOSIS — J01.10 ACUTE NON-RECURRENT FRONTAL SINUSITIS: Primary | ICD-10-CM

## 2021-11-02 PROCEDURE — 3074F SYST BP LT 130 MM HG: CPT | Performed by: NURSE PRACTITIONER

## 2021-11-02 PROCEDURE — 3008F BODY MASS INDEX DOCD: CPT | Performed by: NURSE PRACTITIONER

## 2021-11-02 PROCEDURE — 36415 COLL VENOUS BLD VENIPUNCTURE: CPT

## 2021-11-02 PROCEDURE — 85379 FIBRIN DEGRADATION QUANT: CPT

## 2021-11-02 PROCEDURE — 99214 OFFICE O/P EST MOD 30 MIN: CPT | Performed by: NURSE PRACTITIONER

## 2021-11-02 PROCEDURE — 3078F DIAST BP <80 MM HG: CPT | Performed by: NURSE PRACTITIONER

## 2021-11-02 PROCEDURE — 85027 COMPLETE CBC AUTOMATED: CPT

## 2021-11-02 PROCEDURE — 85025 COMPLETE CBC W/AUTO DIFF WBC: CPT

## 2021-11-02 PROCEDURE — 80061 LIPID PANEL: CPT

## 2021-11-02 PROCEDURE — 84439 ASSAY OF FREE THYROXINE: CPT

## 2021-11-02 PROCEDURE — 80053 COMPREHEN METABOLIC PANEL: CPT

## 2021-11-02 PROCEDURE — 84443 ASSAY THYROID STIM HORMONE: CPT

## 2021-11-02 RX ORDER — DOXYCYCLINE HYCLATE 100 MG/1
100 CAPSULE ORAL 2 TIMES DAILY
Qty: 14 CAPSULE | Refills: 0 | Status: SHIPPED | OUTPATIENT
Start: 2021-11-02 | End: 2021-11-09

## 2021-11-02 RX ORDER — CODEINE PHOSPHATE AND GUAIFENESIN 10; 100 MG/5ML; MG/5ML
5 SOLUTION ORAL NIGHTLY PRN
Qty: 100 ML | Refills: 0 | Status: SHIPPED | OUTPATIENT
Start: 2021-11-02 | End: 2021-11-11

## 2021-11-02 RX ORDER — FLUTICASONE PROPIONATE 50 MCG
2 SPRAY, SUSPENSION (ML) NASAL DAILY
Qty: 16 G | Refills: 0 | Status: SHIPPED | OUTPATIENT
Start: 2021-11-02 | End: 2021-11-11

## 2021-11-02 NOTE — PROGRESS NOTES
Subjective:   Emmy Christy is a 24year old female who presents for Er F/u (cough, chest congestion, sinus congestion, sore throat) . Symptoms started 3 weeks ago. Pt reports extreme fatigue. Negative for fever or chills.  States that she has been marija Cardiovascular: Negative for chest pain, palpitations and leg swelling. Skin: Negative for color change and pallor. Neurological: Positive for headaches. Negative for syncope, facial asymmetry, weakness, light-headedness and numbness.    Hematological Judgment normal.       Assessment & Plan:   Charlie Tan was seen today for er f/u. Diagnoses and all orders for this visit:    Acute non-recurrent frontal sinusitis  -     doxycycline 100 MG Oral Cap;  Take 1 capsule (100 mg total) by mouth 2 (two) times d

## 2021-11-03 ENCOUNTER — HOSPITAL ENCOUNTER (OUTPATIENT)
Dept: CT IMAGING | Facility: HOSPITAL | Age: 21
Discharge: HOME | End: 2021-11-03
Attending: NURSE PRACTITIONER
Payer: COMMERCIAL

## 2021-11-03 ENCOUNTER — TELEPHONE (OUTPATIENT)
Dept: FAMILY MEDICINE CLINIC | Facility: CLINIC | Age: 21
End: 2021-11-03

## 2021-11-03 DIAGNOSIS — R79.89 ELEVATED D-DIMER: ICD-10-CM

## 2021-11-03 PROCEDURE — 71275 CT ANGIOGRAPHY CHEST: CPT | Performed by: NURSE PRACTITIONER

## 2021-11-03 NOTE — TELEPHONE ENCOUNTER
----- Message from PADDY Caldwell sent at 11/3/2021  8:27 AM CDT -----  Discussed results , CT chest ordered , antibiotics sent to pharmacy

## 2021-11-11 ENCOUNTER — OFFICE VISIT (OUTPATIENT)
Dept: FAMILY MEDICINE CLINIC | Facility: CLINIC | Age: 21
End: 2021-11-11
Payer: COMMERCIAL

## 2021-11-11 VITALS
SYSTOLIC BLOOD PRESSURE: 124 MMHG | HEART RATE: 79 BPM | TEMPERATURE: 99 F | HEIGHT: 63 IN | RESPIRATION RATE: 18 BRPM | DIASTOLIC BLOOD PRESSURE: 66 MMHG | WEIGHT: 130 LBS | OXYGEN SATURATION: 100 % | BODY MASS INDEX: 23.04 KG/M2

## 2021-11-11 DIAGNOSIS — R51.9 ACUTE NONINTRACTABLE HEADACHE, UNSPECIFIED HEADACHE TYPE: ICD-10-CM

## 2021-11-11 DIAGNOSIS — Z20.822 CLOSE EXPOSURE TO COVID-19 VIRUS: Primary | ICD-10-CM

## 2021-11-11 PROCEDURE — 3008F BODY MASS INDEX DOCD: CPT | Performed by: NURSE PRACTITIONER

## 2021-11-11 PROCEDURE — 3078F DIAST BP <80 MM HG: CPT | Performed by: NURSE PRACTITIONER

## 2021-11-11 PROCEDURE — 99213 OFFICE O/P EST LOW 20 MIN: CPT | Performed by: NURSE PRACTITIONER

## 2021-11-11 PROCEDURE — 3074F SYST BP LT 130 MM HG: CPT | Performed by: NURSE PRACTITIONER

## 2021-11-11 RX ORDER — TRAZODONE HYDROCHLORIDE 50 MG/1
50 TABLET ORAL NIGHTLY
COMMUNITY
End: 2021-12-08

## 2021-11-11 NOTE — PROGRESS NOTES
CHIEF COMPLAINT:   Patient presents with:  Covid: + exposure, Roommate positive today. Headache: since yesterday      HPI:   Fidencio Whyte is a 24year old female who presents for covid-19 testing.  Patient reports known exposure as her roommate test weakness.     EXAM:   /66   Pulse 79   Temp 98.6 °F (37 °C) (Temporal)   Resp 18   Ht 5' 3\" (1.6 m)   Wt 130 lb (59 kg)   LMP 10/18/2021   SpO2 100%   BMI 23.03 kg/m²   GENERAL: well developed, well nourished,in no apparent distress  SKIN: no rashes, conditions associated with her headache that she notes is the worst headache of her life that I can not fully rule out. I advised she be seen in the Emergency Department or at minimum the [address] [place] [address] for further evaluation.  . Pt verbalized understanding and decline emotions by imagining a peaceful scene. · Massage tight neck, shoulder, and head muscles. · To relax muscles, soak in a hot bath or use a hot shower.   Use medicines  Aspirin or other over-the-counter (OTC) pain medicines such as ibuprofen and acetaminoph arms or legs  · Headaches with seizures  · A fever with a stiff neck or pain when you bend your head toward your chest  · A headache that you would call \"the worst headache you have ever had\" or a severe, persistent headache that gets worse or doesn't ge the time they cannot work. A shorter quarantine period also can lessen stress on the public health system, especially when new infections are rapidly rising.   Your local public health authorities make the final decisions about how long quarantine should la least 60% alcohol. 8. As much as possible, stay in a specific room and away from other people in your home. Also, you should use a separate bathroom, if available. If you need to be around other people in or outside of the home, wear a facemask.    9. Fahad COVID-19, you should also stay home and away from others for a total of 10 days after your symptoms started, and at least 24 hours after your fever is gone and symptoms are getting better, whichever is longer.   Patients with pending COVID-19 test results s will be required to have a repeat COVID-19 test in order to be eligible to donate. If you’re instructed by Kari Irwin that a repeat test is required, please contact the [facility] COVID-19 Nurse Triage Line at [phone].     Additional Information understand if there are risk factors. How do I prevent a Post-COVID condition? The best way to prevent the long-term symptoms of COVID-19 is by preventing COVID-19.     Important ways to slow the spread of COVID 19 are:   • Get the COVID 19 vaccine and

## 2021-11-11 NOTE — PATIENT INSTRUCTIONS
*It was advised that you be seen at the Immediate Care or Emergency Department today for further eval. Declined at this time. 1. Rest. Drink plenty of fluids. 2. Supportive care as discussed. 3. Covid-19 testing sent to lab. .(If positive self [place] about your medicines. Track your headaches  Keeping a headache diary can help you and your healthcare provider identify what's causing your headaches:   · Note when each headache happens.   · Identify your activities and the foods you've eaten 6 to 8 hour (COVID-19)     [facility] is committed to the safety and well-being of our patients, members, employees, and communities.  As concerns arise about the new strain of coronavirus that causes COVID-19, [facility] is here to provide comm date of last exposure with a negative test result (test must occur on day 5 or later)  After stopping quarantine, you should  • Watch for symptoms until 14 days after exposure.   • If you have symptoms, immediately self-isolate and contact your local public test results or are confirmed positive for COVID -19, and your symptoms worsen at home with symptoms such as: extreme weakness, difficult breathing, or unrelenting fevers greater than 100.4 degrees Fahrenheit, you should contact your health care provider b COVID, refrain from exercise until approved by your primary care provider. Please call your primary care provider within 2 days of your discharge to arrange for a telehealth follow-up.  CDC does not recommend repeat testing after a positive test.  Convalesc you can do to manage your health at home, Nilo.nl. pdf  seniorshelf.com.com.au  http://www.[facility].[place].gov/to [email or web address].[facility]/coronavirus/covid-19-information/covid-19-long-haulers. html  Long-term effects of covid-19. (n.d.).  Retrieved May 11, 2021, from [employer].  What it means to be A Coronavirus

## 2021-12-27 ENCOUNTER — TELEPHONE (OUTPATIENT)
Dept: FAMILY MEDICINE CLINIC | Facility: CLINIC | Age: 21
End: 2021-12-27

## 2021-12-27 NOTE — TELEPHONE ENCOUNTER
Patient has had diarrhea for the last 3-4 days to the point that she is unable to control her bowel movement, no fever, no upper respiratory symptoms. Also, Pt has an IUD as of Nov inserted by the [facility] in [place].  Pt is bleeding after inter

## 2021-12-27 NOTE — TELEPHONE ENCOUNTER
FYI: Returned pt's call to obtain additional information. Pt states she got her IUD inserted the second week of November by [facility].  Pt then states on 12/23 after intercourse she had blood and then she had intercourse again another day and the b

## 2021-12-28 ENCOUNTER — TELEMEDICINE (OUTPATIENT)
Dept: FAMILY MEDICINE CLINIC | Facility: CLINIC | Age: 21
End: 2021-12-28

## 2021-12-28 DIAGNOSIS — Z20.822 CLOSE EXPOSURE TO COVID-19 VIRUS: Primary | ICD-10-CM

## 2021-12-28 DIAGNOSIS — R19.7 DIARRHEA, UNSPECIFIED TYPE: ICD-10-CM

## 2021-12-28 PROCEDURE — 99213 OFFICE O/P EST LOW 20 MIN: CPT | Performed by: EMERGENCY MEDICINE

## 2021-12-28 NOTE — PROGRESS NOTES
This is a telemedicine visit with live, interactive video and audio. Radha Soliz verbally consents to a Virtual/Telephone Check-In visit on 12/28/21.     Patient understands and accepts financial responsibility for any deductible, co-insurance an appears stated age and cooperative, Normocephalic, without obvious abnormality, atraumatic, Speaking in full sentences comfortably, Normal work of breathing and Skin color, texture, turgor normal. No rashes or lesions    ASSESSMENT & PLAN  Diagnoses and al

## 2021-12-29 ENCOUNTER — NURSE ONLY (OUTPATIENT)
Dept: LAB | Age: 21
End: 2021-12-29
Attending: EMERGENCY MEDICINE
Payer: COMMERCIAL

## 2021-12-29 DIAGNOSIS — Z20.822 CLOSE EXPOSURE TO COVID-19 VIRUS: ICD-10-CM

## 2021-12-29 DIAGNOSIS — R19.7 DIARRHEA, UNSPECIFIED TYPE: ICD-10-CM

## 2021-12-31 ENCOUNTER — TELEPHONE (OUTPATIENT)
Dept: INTERNAL MEDICINE CLINIC | Facility: HOSPITAL | Age: 21
End: 2021-12-31

## 2021-12-31 NOTE — TELEPHONE ENCOUNTER
Department: Peds unit                                 [x] [facility]  []LINDSEY   [] [address] [place] [address]    Dept Manager/Supervisor/team or clinical lead: Roel Spearing    Position:  [] MD     [] RN     [] Respiratory Therapist     [x] PCT     [] PSR      [] Matt Lim     [] MA [] [place] yes, location:  [place]    What shift do you work? 7am-7pm  When was the last shift you worked?  12/27/21  When are you next scheduled to work? 01/03/21    Did you have close contact with someone on your unit while not wearing a mask? (e.g., during 5, if symptomatic, call Employee Health for RTW screening        []  Plan noted above  [x]  Length of time to obtain results  []  Quarantine instructions  []  S/S of worsening infection/condition and importance of prompt medical re-evaluation including whe

## 2022-01-01 LAB — SARS-COV-2 RNA RESP QL NAA+PROBE: NOT DETECTED

## 2022-01-02 ENCOUNTER — TELEPHONE (OUTPATIENT)
Dept: INTERNAL MEDICINE CLINIC | Facility: HOSPITAL | Age: 22
End: 2022-01-02

## 2022-01-02 NOTE — TELEPHONE ENCOUNTER
Department:  PICU                         [x] [facility]  []LINDSEY   [] [address] [facility]    Dept Manager/Supervisor/team or clinical lead: Ele Johnson    Position:  [] MD     [] RN     [] Respiratory Therapist     [] PCT     [x] PSR      [] BHA     [] MA [] Other -    If non worked? 12/27/21  When are you next scheduled to work? 1/3/2022    Did you have close contact with someone on your unit while not wearing a mask? (e.g., during meal breaks):  Yes []   No [x]    If yes, who:   Do you share a workspace?  Yes []   No [x] symptoms (day 0)    [x]      On day 5, if asymptomatic or mildly symptomatic (with improving symptoms) may return to work day 6  []      On day 5, if symptomatic, call Employee Health for RTW screening        [x]  Plan noted above  [x]  Length of time to o

## 2022-01-14 PROBLEM — F41.1 GENERALIZED ANXIETY DISORDER WITH PANIC ATTACKS: Status: ACTIVE | Noted: 2020-09-29

## 2022-01-14 PROBLEM — F41.0 GENERALIZED ANXIETY DISORDER WITH PANIC ATTACKS: Status: ACTIVE | Noted: 2020-09-29

## 2022-01-14 PROBLEM — F43.10 POST-TRAUMATIC STRESS DISORDER, UNSPECIFIED: Status: ACTIVE | Noted: 2022-01-14

## 2022-03-03 ENCOUNTER — HOSPITAL ENCOUNTER (EMERGENCY)
Facility: HOSPITAL | Age: 22
Discharge: HOME | End: 2022-03-03
Attending: EMERGENCY MEDICINE
Payer: COMMERCIAL

## 2022-03-03 VITALS
WEIGHT: 130.06 LBS | TEMPERATURE: 97 F | SYSTOLIC BLOOD PRESSURE: 125 MMHG | BODY MASS INDEX: 23.93 KG/M2 | HEIGHT: 62 IN | DIASTOLIC BLOOD PRESSURE: 74 MMHG | RESPIRATION RATE: 18 BRPM | HEART RATE: 88 BPM | OXYGEN SATURATION: 98 %

## 2022-03-03 DIAGNOSIS — S63.619A SPRAIN OF FINGER, UNSPECIFIED FINGER, INITIAL ENCOUNTER: Primary | ICD-10-CM

## 2022-03-03 PROCEDURE — 99281 EMR DPT VST MAYX REQ PHY/QHP: CPT

## 2022-03-03 NOTE — ED INITIAL ASSESSMENT (HPI)
Patient has ring stuck on right middle finger. Denies any trauma to the area. Finger red and swollen.

## 2022-03-31 ENCOUNTER — PATIENT MESSAGE (OUTPATIENT)
Dept: FAMILY MEDICINE CLINIC | Facility: CLINIC | Age: 22
End: 2022-03-31

## 2022-03-31 NOTE — TELEPHONE ENCOUNTER
Not sure which \"hormones\" she is looking to test as there are no specific ones to test for acne. We can order basic annual labs.

## 2022-03-31 NOTE — TELEPHONE ENCOUNTER
From: Edwina Laguerre  To: Ellis Estes MD  Sent: 3/31/2022 7:23 AM CDT  Subject: Labs    Can you order me labs to check my hormones, I have been dealing with acne and am going to see a dermatologist, but I would like to have my hormones checked prior to my first visit so I can bring the results to my dermatologist.

## 2022-03-31 NOTE — TELEPHONE ENCOUNTER
Pt is going to see derm for acne and would like to have her hormone levels checked first. Please advise, thanks.

## 2022-04-21 ENCOUNTER — PATIENT MESSAGE (OUTPATIENT)
Dept: FAMILY MEDICINE CLINIC | Facility: CLINIC | Age: 22
End: 2022-04-21

## 2022-04-21 NOTE — TELEPHONE ENCOUNTER
From: Daryl Leblanc  To: Los Trevizo MD  Sent: 4/21/2022 1:50 PM CDT  Subject: Acne    I have been dealing with severe acne for a couple months now and nothing I have tried is working. When I was a teenager I was prescribed doxycycline and tretinoin cream 1%. Is there anyway you can prescribe me these medications to restart and help treat my acne or will I have to schedule an office visit?

## 2022-04-26 ENCOUNTER — OFFICE VISIT (OUTPATIENT)
Dept: FAMILY MEDICINE CLINIC | Facility: CLINIC | Age: 22
End: 2022-04-26
Payer: COMMERCIAL

## 2022-04-26 ENCOUNTER — LAB ENCOUNTER (OUTPATIENT)
Dept: LAB | Age: 22
End: 2022-04-26
Attending: EMERGENCY MEDICINE
Payer: COMMERCIAL

## 2022-04-26 VITALS
SYSTOLIC BLOOD PRESSURE: 100 MMHG | OXYGEN SATURATION: 98 % | BODY MASS INDEX: 27.05 KG/M2 | RESPIRATION RATE: 16 BRPM | HEIGHT: 62 IN | WEIGHT: 147 LBS | HEART RATE: 88 BPM | DIASTOLIC BLOOD PRESSURE: 70 MMHG

## 2022-04-26 DIAGNOSIS — L70.0 ACNE VULGARIS: Primary | ICD-10-CM

## 2022-04-26 DIAGNOSIS — Z13.21 ENCOUNTER FOR VITAMIN DEFICIENCY SCREENING: ICD-10-CM

## 2022-04-26 DIAGNOSIS — Z30.011 ENCOUNTER FOR INITIAL PRESCRIPTION OF CONTRACEPTIVE PILLS: ICD-10-CM

## 2022-04-26 DIAGNOSIS — Z00.00 ROUTINE GENERAL MEDICAL EXAMINATION AT A HEALTH CARE FACILITY: ICD-10-CM

## 2022-04-26 LAB
ALBUMIN SERPL-MCNC: 3.9 G/DL (ref 3.4–5)
ALBUMIN/GLOB SERPL: 1.3 {RATIO} (ref 1–2)
ALP LIVER SERPL-CCNC: 70 U/L
ALT SERPL-CCNC: 21 U/L
ANION GAP SERPL CALC-SCNC: 4 MMOL/L (ref 0–18)
AST SERPL-CCNC: 14 U/L (ref 15–37)
BASOPHILS # BLD AUTO: 0.03 X10(3) UL (ref 0–0.2)
BASOPHILS NFR BLD AUTO: 0.4 %
BILIRUB SERPL-MCNC: 0.3 MG/DL (ref 0.1–2)
BUN BLD-MCNC: 14 MG/DL (ref 7–18)
CALCIUM BLD-MCNC: 8.9 MG/DL (ref 8.5–10.1)
CHLORIDE SERPL-SCNC: 108 MMOL/L (ref 98–112)
CHOLEST SERPL-MCNC: 176 MG/DL (ref ?–200)
CO2 SERPL-SCNC: 29 MMOL/L (ref 21–32)
CREAT BLD-MCNC: 0.55 MG/DL
EOSINOPHIL # BLD AUTO: 0.19 X10(3) UL (ref 0–0.7)
EOSINOPHIL NFR BLD AUTO: 2.5 %
ERYTHROCYTE [DISTWIDTH] IN BLOOD BY AUTOMATED COUNT: 11.9 %
FASTING PATIENT LIPID ANSWER: YES
FASTING STATUS PATIENT QL REPORTED: YES
GLOBULIN PLAS-MCNC: 3 G/DL (ref 2.8–4.4)
GLUCOSE BLD-MCNC: 90 MG/DL (ref 70–99)
HCT VFR BLD AUTO: 45.2 %
HDLC SERPL-MCNC: 71 MG/DL (ref 40–59)
HGB BLD-MCNC: 14.5 G/DL
IMM GRANULOCYTES # BLD AUTO: 0.05 X10(3) UL (ref 0–1)
IMM GRANULOCYTES NFR BLD: 0.7 %
LDLC SERPL CALC-MCNC: 96 MG/DL (ref ?–100)
LYMPHOCYTES # BLD AUTO: 1.78 X10(3) UL (ref 1–4)
LYMPHOCYTES NFR BLD AUTO: 23.2 %
MCH RBC QN AUTO: 30.5 PG (ref 26–34)
MCHC RBC AUTO-ENTMCNC: 32.1 G/DL (ref 31–37)
MCV RBC AUTO: 95.2 FL
MONOCYTES # BLD AUTO: 0.42 X10(3) UL (ref 0.1–1)
MONOCYTES NFR BLD AUTO: 5.5 %
NEUTROPHILS # BLD AUTO: 5.19 X10 (3) UL (ref 1.5–7.7)
NEUTROPHILS # BLD AUTO: 5.19 X10(3) UL (ref 1.5–7.7)
NEUTROPHILS NFR BLD AUTO: 67.7 %
NONHDLC SERPL-MCNC: 105 MG/DL (ref ?–130)
OSMOLALITY SERPL CALC.SUM OF ELEC: 292 MOSM/KG (ref 275–295)
PLATELET # BLD AUTO: 286 10(3)UL (ref 150–450)
POTASSIUM SERPL-SCNC: 4.8 MMOL/L (ref 3.5–5.1)
PROT SERPL-MCNC: 6.9 G/DL (ref 6.4–8.2)
RBC # BLD AUTO: 4.75 X10(6)UL
SODIUM SERPL-SCNC: 141 MMOL/L (ref 136–145)
TRIGL SERPL-MCNC: 41 MG/DL (ref 30–149)
TSI SER-ACNC: 0.85 MIU/ML (ref 0.36–3.74)
VIT D+METAB SERPL-MCNC: 27.1 NG/ML (ref 30–100)
VLDLC SERPL CALC-MCNC: 7 MG/DL (ref 0–30)
WBC # BLD AUTO: 7.7 X10(3) UL (ref 4–11)

## 2022-04-26 PROCEDURE — 80053 COMPREHEN METABOLIC PANEL: CPT

## 2022-04-26 PROCEDURE — 99214 OFFICE O/P EST MOD 30 MIN: CPT | Performed by: NURSE PRACTITIONER

## 2022-04-26 PROCEDURE — 36415 COLL VENOUS BLD VENIPUNCTURE: CPT

## 2022-04-26 PROCEDURE — 84443 ASSAY THYROID STIM HORMONE: CPT

## 2022-04-26 PROCEDURE — 85025 COMPLETE CBC W/AUTO DIFF WBC: CPT

## 2022-04-26 PROCEDURE — 3074F SYST BP LT 130 MM HG: CPT | Performed by: NURSE PRACTITIONER

## 2022-04-26 PROCEDURE — 3008F BODY MASS INDEX DOCD: CPT | Performed by: NURSE PRACTITIONER

## 2022-04-26 PROCEDURE — 80061 LIPID PANEL: CPT

## 2022-04-26 PROCEDURE — 82306 VITAMIN D 25 HYDROXY: CPT

## 2022-04-26 PROCEDURE — 3078F DIAST BP <80 MM HG: CPT | Performed by: NURSE PRACTITIONER

## 2022-04-26 RX ORDER — DROSPIRENONE AND ETHINYL ESTRADIOL 0.02-3(28)
1 KIT ORAL DAILY
Qty: 28 TABLET | Refills: 12 | Status: SHIPPED | OUTPATIENT
Start: 2022-04-26 | End: 2023-04-26

## 2022-04-26 RX ORDER — DOXYCYCLINE HYCLATE 100 MG/1
100 CAPSULE ORAL 2 TIMES DAILY
Qty: 60 CAPSULE | Refills: 0 | Status: SHIPPED | OUTPATIENT
Start: 2022-04-26 | End: 2022-05-26

## 2022-04-26 RX ORDER — TRETINOIN 0.025 %
CREAM (GRAM) TOPICAL NIGHTLY
COMMUNITY

## 2022-04-26 RX ORDER — DROSPIRENONE/ETHINYL ESTRADIOL/LEVOMEFOLATE CALCIUM AND LEVOMEFOLATE CALCIUM 3-0.03(21)
1 KIT ORAL DAILY
Qty: 365 TABLET | Refills: 1 | Status: CANCELLED | OUTPATIENT
Start: 2022-04-26 | End: 2023-04-26

## 2022-04-27 ENCOUNTER — TELEPHONE (OUTPATIENT)
Dept: FAMILY MEDICINE CLINIC | Facility: CLINIC | Age: 22
End: 2022-04-27

## 2022-04-27 NOTE — TELEPHONE ENCOUNTER
----- Message from PADDY Novoa sent at 4/27/2022  8:56 AM CDT -----  Vitamin D is low -continue taking over the counter Vitamin D 2000IU daily

## 2022-05-13 ENCOUNTER — PATIENT MESSAGE (OUTPATIENT)
Dept: FAMILY MEDICINE CLINIC | Facility: CLINIC | Age: 22
End: 2022-05-13

## 2022-05-13 NOTE — TELEPHONE ENCOUNTER
From: Megan Draft  To: Portillo Hamlin MD  Sent: 5/13/2022 8:17 AM CDT  Subject: Dermatologist referral     Hello, I am trying to make an appointment to start seeing Dr. Maty Rojas for my acne, however the office stated I need a referral from my PCP before I can make the appointment.

## 2022-06-06 ENCOUNTER — HOSPITAL ENCOUNTER (OUTPATIENT)
Age: 22
Discharge: HOME | End: 2022-06-06
Attending: EMERGENCY MEDICINE
Payer: COMMERCIAL

## 2022-06-06 VITALS
SYSTOLIC BLOOD PRESSURE: 132 MMHG | HEART RATE: 82 BPM | HEIGHT: 62 IN | RESPIRATION RATE: 18 BRPM | DIASTOLIC BLOOD PRESSURE: 90 MMHG | OXYGEN SATURATION: 99 % | BODY MASS INDEX: 26.68 KG/M2 | TEMPERATURE: 98 F | WEIGHT: 145 LBS

## 2022-06-06 DIAGNOSIS — A09 TRAVELER'S DIARRHEA: Primary | ICD-10-CM

## 2022-06-06 LAB
B-HCG UR QL: NEGATIVE
POCT BILIRUBIN URINE: NEGATIVE
POCT BLOOD URINE: NEGATIVE
POCT GLUCOSE URINE: NEGATIVE MG/DL
POCT KETONE URINE: 15 MG/DL
POCT LEUKOCYTE ESTERASE URINE: NEGATIVE
POCT NITRITE URINE: NEGATIVE
POCT PH URINE: 6 (ref 5–8)
POCT PROTEIN URINE: NEGATIVE MG/DL
POCT SPECIFIC GRAVITY URINE: 1.03
POCT URINE CLARITY: CLEAR
POCT URINE COLOR: YELLOW
POCT UROBILINOGEN URINE: 0.2 MG/DL

## 2022-06-06 PROCEDURE — 99213 OFFICE O/P EST LOW 20 MIN: CPT

## 2022-06-06 PROCEDURE — 81025 URINE PREGNANCY TEST: CPT

## 2022-06-06 PROCEDURE — 99214 OFFICE O/P EST MOD 30 MIN: CPT

## 2022-06-06 PROCEDURE — 81002 URINALYSIS NONAUTO W/O SCOPE: CPT | Performed by: EMERGENCY MEDICINE

## 2022-06-06 RX ORDER — LEVOFLOXACIN 500 MG/1
500 TABLET, FILM COATED ORAL DAILY
Qty: 3 TABLET | Refills: 0 | Status: SHIPPED | OUTPATIENT
Start: 2022-06-06 | End: 2022-06-09

## 2022-06-06 NOTE — ED INITIAL ASSESSMENT (HPI)
Pt presents today with c/o n/v/d x 3 days. Pt states that she was in [facility] and just got back last night. Pt states that the vomiting has improved but that she has had diarrhea 6x this morning. Pt denies any fevers or blood in her vomit/stool. Pt has intermittent abdominal pain.

## 2022-06-16 ENCOUNTER — PATIENT MESSAGE (OUTPATIENT)
Dept: FAMILY MEDICINE CLINIC | Facility: CLINIC | Age: 22
End: 2022-06-16

## 2022-06-17 ENCOUNTER — OFFICE VISIT (OUTPATIENT)
Dept: FAMILY MEDICINE CLINIC | Facility: CLINIC | Age: 22
End: 2022-06-17
Payer: COMMERCIAL

## 2022-06-17 VITALS
SYSTOLIC BLOOD PRESSURE: 114 MMHG | OXYGEN SATURATION: 100 % | RESPIRATION RATE: 15 BRPM | HEART RATE: 91 BPM | WEIGHT: 140 LBS | DIASTOLIC BLOOD PRESSURE: 72 MMHG | BODY MASS INDEX: 26 KG/M2

## 2022-06-17 DIAGNOSIS — L70.9 ACNE, UNSPECIFIED ACNE TYPE: Primary | ICD-10-CM

## 2022-06-17 DIAGNOSIS — N92.6 IRREGULAR MENSES: ICD-10-CM

## 2022-06-17 DIAGNOSIS — F41.1 GENERALIZED ANXIETY DISORDER WITH PANIC ATTACKS: ICD-10-CM

## 2022-06-17 DIAGNOSIS — F41.0 GENERALIZED ANXIETY DISORDER WITH PANIC ATTACKS: ICD-10-CM

## 2022-06-17 PROCEDURE — 3074F SYST BP LT 130 MM HG: CPT | Performed by: EMERGENCY MEDICINE

## 2022-06-17 PROCEDURE — 3078F DIAST BP <80 MM HG: CPT | Performed by: EMERGENCY MEDICINE

## 2022-06-17 PROCEDURE — 99214 OFFICE O/P EST MOD 30 MIN: CPT | Performed by: EMERGENCY MEDICINE

## 2022-06-17 RX ORDER — CEFADROXIL 500 MG/1
1 CAPSULE ORAL 2 TIMES DAILY WITH MEALS
COMMUNITY
Start: 2022-06-14

## 2022-06-20 ENCOUNTER — LABORATORY ENCOUNTER (OUTPATIENT)
Dept: LAB | Age: 22
End: 2022-06-20
Attending: EMERGENCY MEDICINE
Payer: COMMERCIAL

## 2022-06-20 DIAGNOSIS — N92.6 IRREGULAR MENSES: ICD-10-CM

## 2022-06-20 LAB
FSH SERPL-ACNC: 6.3 MIU/ML
PROLACTIN SERPL-MCNC: 20.3 NG/ML

## 2022-06-20 PROCEDURE — 36415 COLL VENOUS BLD VENIPUNCTURE: CPT

## 2022-06-20 PROCEDURE — 84403 ASSAY OF TOTAL TESTOSTERONE: CPT

## 2022-06-20 PROCEDURE — 84402 ASSAY OF FREE TESTOSTERONE: CPT

## 2022-06-20 PROCEDURE — 84146 ASSAY OF PROLACTIN: CPT

## 2022-06-20 PROCEDURE — 83001 ASSAY OF GONADOTROPIN (FSH): CPT

## 2022-06-21 ENCOUNTER — PATIENT MESSAGE (OUTPATIENT)
Dept: FAMILY MEDICINE CLINIC | Facility: CLINIC | Age: 22
End: 2022-06-21

## 2022-06-21 NOTE — TELEPHONE ENCOUNTER
From: Librado Anderson  To: Lambert Montana MD  Sent: 6/21/2022 7:44 AM CDT  Subject: Question regarding Testosterone blood work     Has the testosterone blood test not been resulted yet?  I went in on Monday to get my lab work done, I just want to make sure it was drawn along with my prolactin and [address] [place] [address] blood test.

## 2022-07-02 LAB
SEX HORMONE BINDING GLOBULIN: 107 NMOL/L
TESTOSTERONE -MS, BIOAVAILAB: 17 NG/DL
TESTOSTERONE, -MS/MS: 78 NG/DL
TESTOSTERONE, FREE -MS/MS: 5.8 PG/ML

## 2022-07-20 ENCOUNTER — TELEPHONE (OUTPATIENT)
Dept: FAMILY MEDICINE CLINIC | Facility: CLINIC | Age: 22
End: 2022-07-20

## 2022-07-20 DIAGNOSIS — Z11.1 SCREENING FOR TUBERCULOSIS: Primary | ICD-10-CM

## 2022-07-20 NOTE — TELEPHONE ENCOUNTER
Pt calling for lab order for tb. Pt's daughter at bedside, confirms pt is DNR/DNI.  given 1L ivf by EMS and given 2nd L ivf in ER. abx ordered. repeat SBP 70's-80's.  pt moved to crit area, started on low dose peripheral levophed, repeat 's.  wbc 14, creat 6.0, K+ 4.8, lactate 1.7, UA pending.  CXR improved from prior.  case d/w ICU fellow, pt to be admitted to SDU.

## 2022-07-20 NOTE — TELEPHONE ENCOUNTER
Needs order for tb blood test, quantiferum for school     Needs for college, results needs to be received this week for her clinicals

## 2022-07-21 ENCOUNTER — LAB ENCOUNTER (OUTPATIENT)
Dept: LAB | Age: 22
End: 2022-07-21
Attending: FAMILY MEDICINE
Payer: COMMERCIAL

## 2022-07-21 DIAGNOSIS — Z11.1 SCREENING FOR TUBERCULOSIS: ICD-10-CM

## 2022-07-21 PROCEDURE — 86480 TB TEST CELL IMMUN MEASURE: CPT

## 2022-07-21 PROCEDURE — 36415 COLL VENOUS BLD VENIPUNCTURE: CPT

## 2022-07-25 LAB
M TB IFN-G CD4+ T-CELLS BLD-ACNC: 0.02 IU/ML
M TB TUBERC IFN-G BLD QL: NEGATIVE
M TB TUBERC IGNF/MITOGEN IGNF CONTROL: >10 IU/ML
QFT TB1 AG MINUS NIL: -0.01 IU/ML
QFT TB2 AG MINUS NIL: -0.01 IU/ML

## 2022-08-18 ENCOUNTER — HOSPITAL ENCOUNTER (OUTPATIENT)
Dept: ULTRASOUND IMAGING | Age: 22
Discharge: HOME | End: 2022-08-18
Attending: OBSTETRICS & GYNECOLOGY
Payer: COMMERCIAL

## 2022-08-18 DIAGNOSIS — N92.6 IRREGULAR MENSES: ICD-10-CM

## 2022-08-18 PROCEDURE — 76830 TRANSVAGINAL US NON-OB: CPT | Performed by: OBSTETRICS & GYNECOLOGY

## 2022-08-18 PROCEDURE — 76856 US EXAM PELVIC COMPLETE: CPT | Performed by: OBSTETRICS & GYNECOLOGY

## 2022-10-31 ENCOUNTER — HOSPITAL ENCOUNTER (OUTPATIENT)
Age: 22
Discharge: HOME | End: 2022-10-31
Payer: COMMERCIAL

## 2022-10-31 VITALS
TEMPERATURE: 102 F | SYSTOLIC BLOOD PRESSURE: 145 MMHG | RESPIRATION RATE: 18 BRPM | HEART RATE: 116 BPM | OXYGEN SATURATION: 98 % | DIASTOLIC BLOOD PRESSURE: 95 MMHG

## 2022-10-31 DIAGNOSIS — J10.1 INFLUENZA A: ICD-10-CM

## 2022-10-31 DIAGNOSIS — R50.9 FEVER: Primary | ICD-10-CM

## 2022-10-31 LAB
POCT INFLUENZA A: POSITIVE
POCT INFLUENZA B: NEGATIVE
S PYO AG THROAT QL: NEGATIVE
SARS-COV-2 RNA RESP QL NAA+PROBE: NOT DETECTED

## 2022-10-31 PROCEDURE — 87502 INFLUENZA DNA AMP PROBE: CPT | Performed by: NURSE PRACTITIONER

## 2022-10-31 PROCEDURE — 99213 OFFICE O/P EST LOW 20 MIN: CPT | Performed by: NURSE PRACTITIONER

## 2022-10-31 PROCEDURE — U0002 COVID-19 LAB TEST NON-CDC: HCPCS | Performed by: NURSE PRACTITIONER

## 2022-10-31 PROCEDURE — 87880 STREP A ASSAY W/OPTIC: CPT | Performed by: NURSE PRACTITIONER

## 2022-10-31 RX ORDER — IBUPROFEN 600 MG/1
600 TABLET ORAL ONCE
Status: DISCONTINUED | OUTPATIENT
Start: 2022-10-31 | End: 2022-10-31

## 2022-10-31 RX ORDER — IBUPROFEN 600 MG/1
600 TABLET ORAL ONCE
Status: COMPLETED | OUTPATIENT
Start: 2022-10-31 | End: 2022-10-31

## 2022-10-31 NOTE — ED QUICK NOTES
Fabian Mack at bedside. Discussing her care with her. Patient informed him she does not want to wait for her temp to come down. Water given. Put water in her purse.

## 2022-10-31 NOTE — DISCHARGE INSTRUCTIONS
Follow-up with your primary care physician for all of your healthcare needs  Increase fluids keep well-hydrated  Increase vitamin C intake  Tylenol and ibuprofen alternate these medications every 4 hours for the fever, body aches, and chills  Return to the emergency room if any worse symptoms or concerns.

## 2022-10-31 NOTE — ED QUICK NOTES
Patient came out of exam room yelling that her fever is going up and is angry about not getting \"supportive care\". I asked her if she would like the Ibuprofen that I brought in earlier and she said yes. Medication given. Continues to  room and yell about not receiving IV fluids and the type of care she expects from us. Informed Johanna Gama. Will continue to monitor.

## 2022-10-31 NOTE — ED INITIAL ASSESSMENT (HPI)
Patient c/o sore throat and cough for 3 days. Body aches and headaches for 2 days. Fever last night.

## 2022-11-10 ENCOUNTER — TELEPHONE (OUTPATIENT)
Dept: FAMILY MEDICINE CLINIC | Facility: CLINIC | Age: 22
End: 2022-11-10

## 2022-11-10 ENCOUNTER — PATIENT MESSAGE (OUTPATIENT)
Dept: FAMILY MEDICINE CLINIC | Facility: CLINIC | Age: 22
End: 2022-11-10

## 2022-11-10 NOTE — TELEPHONE ENCOUNTER
Pt c/o of oral thrush new onset. Pt states isn't improving. pt is very worried about HIV. Please advise pt regarding testing if needed.

## 2022-11-10 NOTE — TELEPHONE ENCOUNTER
Pt has scheduled VV for thrush that she states she has had for approximately a year. Concerned for HIV. States had STD testing last year but did not include HIV. 8/14/21 Positive for Candida, Gardnerella, and treated for Chlamydia.

## 2022-11-24 ENCOUNTER — HOSPITAL ENCOUNTER (OUTPATIENT)
Age: 22
Discharge: HOME | End: 2022-11-24
Attending: EMERGENCY MEDICINE
Payer: COMMERCIAL

## 2022-11-24 VITALS
DIASTOLIC BLOOD PRESSURE: 73 MMHG | TEMPERATURE: 98 F | OXYGEN SATURATION: 98 % | SYSTOLIC BLOOD PRESSURE: 127 MMHG | RESPIRATION RATE: 14 BRPM | HEART RATE: 74 BPM

## 2022-11-24 DIAGNOSIS — H16.001 CORNEAL ULCERATION, RIGHT: Primary | ICD-10-CM

## 2022-11-24 PROCEDURE — 99213 OFFICE O/P EST LOW 20 MIN: CPT

## 2022-11-24 RX ORDER — MOXIFLOXACIN 5 MG/ML
1 SOLUTION/ DROPS OPHTHALMIC 3 TIMES DAILY
Qty: 3 ML | Refills: 0 | Status: SHIPPED | OUTPATIENT
Start: 2022-11-24

## 2022-11-24 NOTE — ED INITIAL ASSESSMENT (HPI)
Pt has had eye irritation, sensitivity to light and excessive tearing since yesterday.   She reused contacts that should have been thrown out

## 2022-11-24 NOTE — DISCHARGE INSTRUCTIONS
Contact ophthalmologist next business day  No contacts until cleared by ophthalmology to use them  Avoid bright lights and eyestrain activity  Tylenol or Motrin for pain  Vigamox antibiotic

## 2023-08-04 ENCOUNTER — OFFICE VISIT (OUTPATIENT)
Dept: FAMILY MEDICINE CLINIC | Facility: CLINIC | Age: 23
End: 2023-08-04
Payer: COMMERCIAL

## 2023-08-04 DIAGNOSIS — Z53.21 PROCEDURE AND TREATMENT NOT CARRIED OUT DUE TO PATIENT LEAVING PRIOR TO BEING SEEN BY HEALTH CARE PROVIDER: Primary | ICD-10-CM

## 2023-11-07 ENCOUNTER — TELEPHONE (OUTPATIENT)
Facility: CLINIC | Age: 23
End: 2023-11-07

## 2023-11-08 ENCOUNTER — EMPLOYEE HEALTH (OUTPATIENT)
Dept: OTHER | Facility: HOSPITAL | Age: 23
End: 2023-11-08
Attending: PREVENTIVE MEDICINE

## 2023-11-08 DIAGNOSIS — Z11.1 SCREENING-PULMONARY TB: Primary | ICD-10-CM

## 2023-11-08 PROCEDURE — 86480 TB TEST CELL IMMUN MEASURE: CPT

## 2023-11-10 LAB
M TB IFN-G CD4+ T-CELLS BLD-ACNC: 0.02 IU/ML
M TB TUBERC IFN-G BLD QL: NEGATIVE
M TB TUBERC IGNF/MITOGEN IGNF CONTROL: >10 IU/ML
QFT TB1 AG MINUS NIL: 0.03 IU/ML
QFT TB2 AG MINUS NIL: 0.03 IU/ML

## 2024-01-03 ENCOUNTER — OFFICE VISIT (OUTPATIENT)
Dept: FAMILY MEDICINE CLINIC | Facility: CLINIC | Age: 24
End: 2024-01-03
Payer: COMMERCIAL

## 2024-01-03 VITALS
SYSTOLIC BLOOD PRESSURE: 110 MMHG | BODY MASS INDEX: 23.92 KG/M2 | TEMPERATURE: 98 F | DIASTOLIC BLOOD PRESSURE: 62 MMHG | HEIGHT: 63 IN | WEIGHT: 135 LBS | OXYGEN SATURATION: 98 % | HEART RATE: 72 BPM | RESPIRATION RATE: 16 BRPM

## 2024-01-03 DIAGNOSIS — J06.9 VIRAL UPPER RESPIRATORY ILLNESS: Primary | ICD-10-CM

## 2024-01-03 PROCEDURE — 99213 OFFICE O/P EST LOW 20 MIN: CPT | Performed by: NURSE PRACTITIONER

## 2024-01-03 NOTE — PROGRESS NOTES
CHIEF COMPLAINT:     Chief Complaint   Patient presents with    Sinus Problem     S/s for  5 days.  OTC meds taken       HPI:   Lulú Pulliam is a 23 year old female who presents for sinus congestion for  5  days. Symptoms have been worsening since onset. Sinus congestion/pain is described as a pressure and is located mainly front of face.  Reports nasal discharge. Has treated symptoms with mucinex and otc cold medications.  Patient also reports headache, cough, fullness in ears, ear discomfort.  Denies fever, chills, dental pain, tinnitus, N/V/D.        Current Outpatient Medications   Medication Sig Dispense Refill    moxifloxacin (VIGAMOX) 0.5 % Ophthalmic Solution Place 1 drop into the right eye 3 (three) times daily. (Patient not taking: Reported on 1/3/2024) 3 mL 0    cefadroxil 500 MG Oral Cap Take 1 capsule by mouth 2 (two) times daily with meals. (Patient not taking: Reported on 10/31/2022)      tretinoin 0.025 % External Cream Apply topically nightly. (Patient not taking: Reported on 10/31/2022)      Drospirenone-Ethinyl Estradiol 3-0.02 MG Oral Tab Take 1 tablet by mouth daily. (Patient not taking: Reported on 11/24/2022) 28 tablet 12    traZODone 50 MG Oral Tab Take 1 tablet (50 mg total) by mouth nightly. (Patient not taking: Reported on 1/3/2024) 90 tablet 0    clonazePAM 1 MG Oral Tab Take 1 tablet (1 mg total) by mouth daily as needed for Anxiety. (Patient not taking: Reported on 1/3/2024) 30 tablet 2      Past Medical History:   Diagnosis Date    Acne     Anxiety     Depression       Past Surgical History:   Procedure Laterality Date    ADENOIDECTOMY      REMOVAL OF TONSILS,12+ Y/O      TONSILLECTOMY        Family History   Problem Relation Age of Onset    ADHD Mother     Cancer Maternal Grandfather         Bone unknown Primary    Diabetes Maternal Grandfather     Colon Cancer Other         MGGF, MGGM    Diabetes Maternal Grandmother     Anxiety Brother     Depression Brother     Anxiety  Brother     Depression Brother       Social History     Socioeconomic History    Marital status: Single   Tobacco Use    Smoking status: Never    Smokeless tobacco: Never   Vaping Use    Vaping Use: Never used   Substance and Sexual Activity    Alcohol use: Yes     Comment: rare, social    Drug use: Not Currently     Types: Cannabis    Sexual activity: Yes     Partners: Male     Birth control/protection: Condom         REVIEW OF SYSTEMS:   GENERAL: feels well otherwise, no unplanned weight change,  good appetite  SKIN: no rashes or abnormal skin lesions  HEENT: See HPI.    LUNGS: denies shortness of breath or wheezing, See HPI  CARDIOVASCULAR: denies chest pain or palpitations   GI: denies N/V/C or abdominal pain  NEURO: + sinus headaches.  No numbness or tingling in face.    EXAM:   /62   Pulse 72   Temp 98.2 °F (36.8 °C) (Oral)   Resp 16   Ht 5' 3\" (1.6 m)   Wt 135 lb (61.2 kg)   LMP 01/01/2024 (Exact Date)   SpO2 98%   BMI 23.91 kg/m²   GENERAL: well developed, well nourished,in no apparent distress  SKIN: no rashes,no suspicious lesions  HEAD: atraumatic, normocephalic,  mild pressure on palpation of sinuses  EYES: conjunctiva clear, EOM intact  EARS: TM's clear gray, no bulging, no retraction, + fluid, bony landmarks intact  NOSE: nostrils patent, clear nasal mucous, nasal mucosa reddened and swollen  THROAT: oral mucosa pink, moist. No visible dental caries. Posterior pharynx is mild erythematous. no exudates.  NECK: supple, non-tender  LUNGS: clear to auscultation bilaterally, no wheezes or rhonchi, no diminished breath sounds. Breathing is non labored.  CARDIO: RRR without murmur  EXTREMITIES: no cyanosis, clubbing or edema  LYMPH:  bilateral anterior cervical lymphadenopathy.   NEURO: No focal deficits       ASSESSMENT AND PLAN:   ASSESSMENT:  Lulú Pulliam is a 23 year old female who presents with    ASSESSMENT:   Encounter Diagnosis   Name Primary?    Viral upper respiratory illness Yes      1. Viral upper respiratory illness  Add flonase  Declines covid test      PLAN: Meds and instructions as below.  Comfort care instructions as listed in Patient Instructions.  To f/u with PCP if no improvement in 5-7 days or sooner if sx worsen.    Meds & Refills for this Visit:  Requested Prescriptions      No prescriptions requested or ordered in this encounter       Risks, benefits, side effects of medication addressed and explained.    See pt handout    The patient indicates understanding of these issues and agrees to the plan.

## 2024-01-16 ENCOUNTER — OFFICE VISIT (OUTPATIENT)
Dept: FAMILY MEDICINE CLINIC | Facility: CLINIC | Age: 24
End: 2024-01-16
Payer: COMMERCIAL

## 2024-01-16 VITALS
OXYGEN SATURATION: 100 % | WEIGHT: 134 LBS | HEIGHT: 63 IN | HEART RATE: 74 BPM | BODY MASS INDEX: 23.74 KG/M2 | DIASTOLIC BLOOD PRESSURE: 70 MMHG | RESPIRATION RATE: 16 BRPM | SYSTOLIC BLOOD PRESSURE: 100 MMHG

## 2024-01-16 DIAGNOSIS — Z00.00 ENCOUNTER FOR ANNUAL PHYSICAL EXAMINATION EXCLUDING GYNECOLOGICAL EXAMINATION IN A PATIENT OLDER THAN 17 YEARS: Primary | ICD-10-CM

## 2024-01-16 DIAGNOSIS — F41.9 ANXIETY DISORDER, UNSPECIFIED TYPE: ICD-10-CM

## 2024-01-16 DIAGNOSIS — E28.2 PCOS (POLYCYSTIC OVARIAN SYNDROME): ICD-10-CM

## 2024-01-16 DIAGNOSIS — L65.9 THINNING HAIR: ICD-10-CM

## 2024-01-16 DIAGNOSIS — Z23 NEED FOR HPV VACCINATION: ICD-10-CM

## 2024-01-16 PROCEDURE — 99395 PREV VISIT EST AGE 18-39: CPT | Performed by: EMERGENCY MEDICINE

## 2024-01-16 PROCEDURE — 99213 OFFICE O/P EST LOW 20 MIN: CPT | Performed by: EMERGENCY MEDICINE

## 2024-01-16 PROCEDURE — 90651 9VHPV VACCINE 2/3 DOSE IM: CPT | Performed by: EMERGENCY MEDICINE

## 2024-01-16 PROCEDURE — 3008F BODY MASS INDEX DOCD: CPT | Performed by: EMERGENCY MEDICINE

## 2024-01-16 PROCEDURE — 90471 IMMUNIZATION ADMIN: CPT | Performed by: EMERGENCY MEDICINE

## 2024-01-16 PROCEDURE — 3074F SYST BP LT 130 MM HG: CPT | Performed by: EMERGENCY MEDICINE

## 2024-01-16 PROCEDURE — 3078F DIAST BP <80 MM HG: CPT | Performed by: EMERGENCY MEDICINE

## 2024-01-16 NOTE — PATIENT INSTRUCTIONS
Thank you for choosing [facility]  To Do:  FOR ANSHU ROBERTSON    Have blood tests done  Follow up for discussion of anxiety and possible ADHD  HPV shot today          Well balanced diet recommended.    Routine exercise recommended most days during the week.  Wear sunscreen - SPF 15 or higher and reapply every 2 hours as needed.  Wear seat belts and drive safely.  Schedule regular appointments with dentist.  Schedule yearly eye exam if you wear glasses/contacts.  Yearly Flu Vaccine recommended.  Tetanus, Diptheria and Pertussis vaccine should be given every 7-10 years.  Call or come in if there are concerns regarding domestic abuse, sexually transmitted diseases, alcohol/drug addiction, depression/anxiety issues, or any further concerns.          Prevention Guidelines, Women Ages 18 to 39  Screening tests and vaccines are an important part of managing your health. Health counseling is essential, too. Below are guidelines for these, for women ages 18 to 39. Talk with your healthcare provider to make sure you’re up-to-date on what you need.  Screening Who needs it How often   Alcohol misuse All women in this age group At routine exams   Blood pressure All women in this age group Every 2 years if your blood pressure is less than 120/80 mm Hg; yearly if your systolic blood pressure is 120 to 139 mm Hg, or your diastolic blood pressure reading is 80 to 89 mm Hg   Breast cancer All women in this age group should talk with their healthcare providers about the need for clinical breast exams (CBE)1 Clinical breast exam every 3 years1   Cervical cancer Women ages 21 and older Women between ages 21 and 29 should have a Pap test every 3 years; women between ages 30 and 65 are advised to have a Pap test plus an HPV test every 5 years   Chlamydia Sexually active women ages 24 and younger, and women at increased risk for infection Every 3 years if you're at risk or have symptoms   Depression All women in this age  group At routine exams   Diabetes mellitus, type 2 Adults with no symptoms who are overweight or obese and have 1 or more other risk factors for diabetes At least every 3 years   Gonorrhea Sexually active women at increased risk for infection At routine exams   Hepatitis C Anyone at increased risk At routine exams   HIV All women At routine exams   Obesity All women in this age group At routine exams   Syphilis Women at increased risk for infection - talk with your healthcare provider At routine exams   Tuberculosis Women at increased risk for infection - talk with your healthcare provider Ask your healthcare provider   Vision All women in this age group At least 1 complete exam in your 20s, and 2 in your 30s   Vaccine2 Who needs it How often   Chickenpox (varicella) All women in this age group who have no record of this infection or vaccine 2 doses; the second dose should be given 4 to 8 weeks after the first dose   Hepatitis A Women at increased risk for infection - talk with your healthcare provider 2 doses given at least 6 months apart   Hepatitis B Women at increased risk for infection - talk with your healthcare provider 3 doses over 6 months; second dose should be given 1 month after the first dose; the third dose should be given at least 2 months after the second dose and at least 4 months after the first dose   Haemophilus influenzae Type B (HIB) Women at increased risk for infection - talk with your healthcare provider 1 to 3 doses   Human papillomavirus (HPV) All women in this age group up to age 26 3 doses; the second dose should be given 1 to 2 months after the first dose and the third dose given 6 months after the first dose   Influenza (flu) All women in this age group Once a year   Measles, mumps, rubella (MMR) All women in this age group who have no record of these infections or vaccines 1 or 2 doses   Meningococcal Women at increased risk for infection - talk with your healthcare provider 1 or more  doses   Pneumococcal conjugate vaccine (PCV13) and pneumococcal polysaccharide vaccine (PPSV23) Women at increased risk for infection - talk with your healthcare provider PCV13: 1 dose ages 19 to 65 (protects against 13 types of pneumococcal bacteria)  PPSV23: 1 to 2 doses through age 64, or 1 dose at 65 or older (protects against 23 types of pneumococcal bacteria)      Tetanus/diphtheria/pertussis (Td/Tdap) booster All women in this age group Td every 10 years, or a one-time dose of Tdap instead of a Td booster after age 18, then Td every 10 years   Counseling Who needs it How often   BRCA gene mutation testing for breast and ovarian cancer susceptibility Women with increased risk for having gene mutation When your risk is known   Breast cancer and chemoprevention Women at high risk for breast cancer When your risk is known   Diet and exercise Women who are overweight or obese When diagnosed, and then at routine exams   Domestic violence Women at the age in which they are able to have children At routine exams   Sexually transmitted infection prevention Women who are sexually active At routine exams   Skin cancer Prevention of skin cancer in fair-skinned adults through age 24 At routine exams   Use of tobacco and the health effects it can cause All women in this age group Every visit   1According to the ACS, women ages 20 to 39 years should have a clinical breast exam (CBE) as part of their routine health exam every 3 years, and breast self-exams are an option for women starting in their 20s. However, the  USPSTF does not recommend CBE.  2Those who are 18 years of age, who are not up-to-date on their childhood immunizations, should receive all appropriate catch-up vaccines recommended by the CDC.  Date Last Reviewed: 8/27/2015  © 5582-3105 [employer]. [address] [place] [address], [place] [address]. All rights reserved. This information is not intended as a substitute for professional medical care. Always  follow your healthcare professional's instructions.

## 2024-01-16 NOTE — PROGRESS NOTES
Lulú Pulliam is a 23 year old female who presents for a complete physical exam.   HPI:     Chief Complaint   Patient presents with    Physical          Age: 23    1First day of last menstrual period (or first year of         menstruation, if through menopause Reyes 3   2Number of times pregnant: 0              Number of completed pregnancies:  0              Date of last pregnancy:  0   3. If you are under age 55, what method of birth control do you use? No              Are you planning a pregnancy  in the next 6-12 months? No   4. Have you had any of the following problems:               A.  Abnormal Pap smears  Never had               If yes, date:                 problem:              B. High blood pressure, heart disease or high cholesterol  NO                  D.Abdominal or pelvic surgery or special tests  NO   5. Do you have any of the following:      Problems with present method of birth control NA   Bleeding between periods or since periods stopped     A new or enlarging lump in breast NO      Change in size/firmness of stools  NO   Change in size/color of a mole    6. Do you have a parent, brother or sister with a history of the following:                  Cancer of the breast, intestine or female organs No                Heart pain or heart attacks  before the age of 55 NO   8. Have you ever used tobacco?     NO     9. Do you drink alcohol?      2 drinks a week at most    10. Prevention:         b. Exercise:                              Activity:  YES       c. Do you always wear seat belts?          YES       d. If over 30 years old, have you  had your cholesterol level checked  in the past five years? YES NO       e. Have you had a tetanus shot  the past 10 years? YES 2021       f. Does your house have a working smoke detector? YES        i.  How many sexual partners have you  had in the last 12 months? 1    j. When is the last time you had           a dental check-up?  8 months               11.  Please describe any concerns you have:       Worried about hormones for PCOS  States that she has been using over-the-counter supplements and seems to be doing better.  Her.'s have been irregular she states.  She stopped the birth control that was prescribed by OB because she states it did not feel well well on them.       Worried about thinning hair. Ponytail is smaller and thinner  No special or restrictive diets    Admits to being anxious.  States that she has trouble keeping concentration.  Admits to being overwhelmed easily.  Admits to constant worry and rumination.  Admits to poor sleep.  She states that she has been anxious all her life.  She believes she may have ADHD as she states she has poor concentration and difficulty completing her tasks at the workplace.              Wt Readings from Last 3 Encounters:   01/16/24 134 lb (60.8 kg)   01/03/24 135 lb (61.2 kg)   06/17/22 140 lb (63.5 kg)        BP Readings from Last 3 Encounters:   01/16/24 100/70   01/03/24 110/62   11/24/22 127/73         Patient's last menstrual period was 01/01/2024 (exact date).       No current outpatient medications on file.      Past Medical History:   Diagnosis Date    Acne     Anxiety     Depression       Past Surgical History:   Procedure Laterality Date    ADENOIDECTOMY      REMOVAL OF TONSILS,12+ Y/O      TONSILLECTOMY        Family History   Problem Relation Age of Onset    ADHD Mother     Cancer Maternal Grandfather         Bone unknown Primary    Diabetes Maternal Grandfather     Colon Cancer Other         MGGF, MGGM    Diabetes Maternal Grandmother     Anxiety Brother     Depression Brother     Anxiety Brother     Depression Brother       Social History     Socioeconomic History    Marital status: Single   Tobacco Use    Smoking status: Never    Smokeless tobacco: Never   Vaping Use    Vaping Use: Never used   Substance and Sexual Activity    Alcohol use: Yes     Alcohol/week: 2.0 standard drinks of alcohol     Types: 2  Glasses of wine per week     Comment: rare, social    Drug use: Not Currently     Types: Cannabis    Sexual activity: Yes     Partners: Male     Birth control/protection: Condom   Other Topics Concern    Caffeine Concern Yes    Exercise Yes    Seat Belt Yes    Special Diet No    Stress Concern No    Weight Concern Yes        No current outpatient medications on file.      Past Medical History:   Diagnosis Date    Acne     Anxiety     Depression       Past Surgical History:   Procedure Laterality Date    ADENOIDECTOMY      REMOVAL OF TONSILS,12+ Y/O      TONSILLECTOMY        Family History   Problem Relation Age of Onset    ADHD Mother     Cancer Maternal Grandfather         Bone unknown Primary    Diabetes Maternal Grandfather     Colon Cancer Other         MGGF, MGGM    Diabetes Maternal Grandmother     Anxiety Brother     Depression Brother     Anxiety Brother     Depression Brother       Social History:   Social History     Socioeconomic History    Marital status: Single   Tobacco Use    Smoking status: Never    Smokeless tobacco: Never   Vaping Use    Vaping Use: Never used   Substance and Sexual Activity    Alcohol use: Yes     Alcohol/week: 2.0 standard drinks of alcohol     Types: 2 Glasses of wine per week     Comment: rare, social    Drug use: Not Currently     Types: Cannabis    Sexual activity: Yes     Partners: Male     Birth control/protection: Condom   Other Topics Concern    Caffeine Concern Yes    Exercise Yes    Seat Belt Yes    Special Diet No    Stress Concern No    Weight Concern Yes            REVIEW OF SYSTEMS:   GENERAL HEALTH: feels well, no fatigue.  SKIN: denies any unusual skin lesions or rashes  EYES: no visual complaints or deficits  HEENT: denies nasal congestion, sinus pain or sore throat; hearing loss negative,   RESPIRATORY: denies shortness of breath, wheezing or cough   CARDIOVASCULAR: denies chest pain, SOB, edema,orthopnea, no palpitations   GI: denies nausea, vomiting,  constipation, diarrhea; no rectal bleeding; no heartburn  GENITAL/: no dysuria, urgency or frequency  MUSCULOSKELETAL: no joint complaints upper or lower extremities  NEURO: no sensory or motor complaint  HEMATOLOGY: denies hx anemia; denies bruising or excessive bleeding  ENDOCRINE: denies excessive thirst or urination; denies unexpected wt gain or wt loss  ALLERGY/IMM.: denies food or seasonal allergies  PSYCH: no symptoms of depression or anxiety, depression screening negative.      EXAM:   /70   Pulse 74   Resp 16   Ht 5' 3\" (1.6 m)   Wt 134 lb (60.8 kg)   LMP 01/01/2024 (Exact Date)   SpO2 100%   BMI 23.74 kg/m²      General: WD/WN in no acute distress.   HEENT: PERRLA and EOMI.  OP moist no lesions.TM WNL, remington.Normal ears canals bilaterally.  Neck is supple, with no cervical LAD or thyroid abnormalities. No carotid bruits.    Lungs: are clear to auscultation bilaterally, with no wheeze, rhonchi, or rales.   Heart: is RRR.  S1, S2, with no murmurs,clicks, gallops  Abdomen: is soft,NBS, NT/ND with no HSM.  No rebound or guarding. No CVA tenderness, no hernias.   exam: Deferred  Neuro: Cranial nerves II-XII normal,no focal abnormalities, and reflexes coordination and gait normal and symmetric.Sensation intact.  Extremities: are symmetric with no cyanosis, clubbing, or edema.  MS: Normal muscles tones, no joints abnormalities.  SKIN: Normal color, turgor, no lesions, rashes or wounds.  PSYCH: normal affect and mood.      ASSESSMENT AND PLAN:       1. Encounter for annual physical examination excluding gynecological examination in a patient older than 17 years  - CBC With Differential With Platelet; Future  - Comp Metabolic Panel (14); Future    2. Thinning hair  - Ferritin [E]; Future  - Anti-Nuclear Antibody (LAURA) by IFA, Reflex Titer + Specific Antibodies; Future  - Sed Rate, Westergren (Automated); Future    3. PCOS (polycystic ovarian syndrome)  - LH (Luteinizing Hormone); Future  - FSH;  Future  - Prolactin; Future  - Dehydroepiandrosterone Sulfate [E]; Future  - TSH W Reflex To Free T4; Future  - Lipid Panel; Future  - Testosterone,Total and Weakly Bound w/ SHBG; Future    4. Anxiety disorder, unspecified type    5. Need for HPV vaccination  - HPV (Gardasil 9)        Lulú Pulliam is a 23 year old female who presents for a complete physical exam.Gyn exam and Pap smear UTD  Self breast exams advised.  The patient should schedule annual mammograms beginning at the age of 40.    Counseled on fat diet and aerobic exercise 30 minutes three times weekly.   Counseled on maintaining a healthy weight and healthy BMI  Health maintenance.   Immunizations reviewed and updated  TDAP UTD  The patient indicates understanding of these issues and agrees to the plan.  The patient is asked  to return yearly for annual preventative health exam.    Well balanced diet recommended.    Routine exercise recommended most days during the week.  Wear sunscreen - SPF 15 or higher and reapply every 2 hours as needed.  Wear seat belts and drive safely.  Schedule regular appointments with dentist.  Schedule yearly eye exam if you wear glasses/contacts.  Yearly Flu Vaccine recommended.  Tetanus, Diptheria and Pertussis vaccine should be given every 7-10 years.  Call or come in if there are concerns regarding domestic abuse, sexually transmitted diseases, alcohol/drug addiction, depression/anxiety issues, or any further concerns.    PATIENT INSTRUCTIONS:    Have blood tests done  Follow up for discussion of anxiety and possible ADHD  HPV shot today      FOLLOW UP:  In 2-3 weeks for thorough discussion of possible ADHD and anxiety

## 2024-01-17 ENCOUNTER — LAB ENCOUNTER (OUTPATIENT)
Dept: LAB | Age: 24
End: 2024-01-17
Attending: EMERGENCY MEDICINE
Payer: COMMERCIAL

## 2024-01-17 DIAGNOSIS — L65.9 THINNING HAIR: ICD-10-CM

## 2024-01-17 DIAGNOSIS — Z00.00 ENCOUNTER FOR ANNUAL PHYSICAL EXAMINATION EXCLUDING GYNECOLOGICAL EXAMINATION IN A PATIENT OLDER THAN 17 YEARS: ICD-10-CM

## 2024-01-17 DIAGNOSIS — E28.2 PCOS (POLYCYSTIC OVARIAN SYNDROME): ICD-10-CM

## 2024-01-17 LAB
ALBUMIN SERPL-MCNC: 4.6 G/DL (ref 3.4–5)
ALBUMIN/GLOB SERPL: 1.6 {RATIO} (ref 1–2)
ALP LIVER SERPL-CCNC: 62 U/L
ALT SERPL-CCNC: 22 U/L
ANION GAP SERPL CALC-SCNC: 3 MMOL/L (ref 0–18)
AST SERPL-CCNC: 13 U/L (ref 15–37)
BASOPHILS # BLD AUTO: 0.02 X10(3) UL (ref 0–0.2)
BASOPHILS NFR BLD AUTO: 0.2 %
BILIRUB SERPL-MCNC: 0.6 MG/DL (ref 0.1–2)
BUN BLD-MCNC: 11 MG/DL (ref 9–23)
CALCIUM BLD-MCNC: 8.7 MG/DL (ref 8.5–10.1)
CHLORIDE SERPL-SCNC: 101 MMOL/L (ref 98–112)
CHOLEST SERPL-MCNC: 189 MG/DL (ref ?–200)
CO2 SERPL-SCNC: 28 MMOL/L (ref 21–32)
CREAT BLD-MCNC: 0.62 MG/DL
DEPRECATED HBV CORE AB SER IA-ACNC: 42.1 NG/ML
DHEA-S SERPL-MCNC: 420.1 UG/DL
EGFRCR SERPLBLD CKD-EPI 2021: 128 ML/MIN/1.73M2 (ref 60–?)
EOSINOPHIL # BLD AUTO: 0.08 X10(3) UL (ref 0–0.7)
EOSINOPHIL NFR BLD AUTO: 0.9 %
ERYTHROCYTE [DISTWIDTH] IN BLOOD BY AUTOMATED COUNT: 11.6 %
ERYTHROCYTE [SEDIMENTATION RATE] IN BLOOD: 4 MM/HR
FASTING PATIENT LIPID ANSWER: YES
FASTING STATUS PATIENT QL REPORTED: YES
FSH SERPL-ACNC: 6.1 MIU/ML
GLOBULIN PLAS-MCNC: 2.9 G/DL (ref 2.8–4.4)
GLUCOSE BLD-MCNC: 77 MG/DL (ref 70–99)
HCT VFR BLD AUTO: 42.2 %
HDLC SERPL-MCNC: 84 MG/DL (ref 40–59)
HGB BLD-MCNC: 14.2 G/DL
IMM GRANULOCYTES # BLD AUTO: 0.04 X10(3) UL (ref 0–1)
IMM GRANULOCYTES NFR BLD: 0.4 %
LDLC SERPL CALC-MCNC: 98 MG/DL (ref ?–100)
LH SERPL-ACNC: 15.9 MIU/ML
LYMPHOCYTES # BLD AUTO: 2.15 X10(3) UL (ref 1–4)
LYMPHOCYTES NFR BLD AUTO: 23.8 %
MCH RBC QN AUTO: 31.2 PG (ref 26–34)
MCHC RBC AUTO-ENTMCNC: 33.6 G/DL (ref 31–37)
MCV RBC AUTO: 92.7 FL
MONOCYTES # BLD AUTO: 0.45 X10(3) UL (ref 0.1–1)
MONOCYTES NFR BLD AUTO: 5 %
NEUTROPHILS # BLD AUTO: 6.29 X10 (3) UL (ref 1.5–7.7)
NEUTROPHILS # BLD AUTO: 6.29 X10(3) UL (ref 1.5–7.7)
NEUTROPHILS NFR BLD AUTO: 69.7 %
NONHDLC SERPL-MCNC: 105 MG/DL (ref ?–130)
OSMOLALITY SERPL CALC.SUM OF ELEC: 272 MOSM/KG (ref 275–295)
PLATELET # BLD AUTO: 319 10(3)UL (ref 150–450)
POTASSIUM SERPL-SCNC: 3.5 MMOL/L (ref 3.5–5.1)
PROLACTIN SERPL-MCNC: 14.6 NG/ML
PROT SERPL-MCNC: 7.5 G/DL (ref 6.4–8.2)
RBC # BLD AUTO: 4.55 X10(6)UL
SODIUM SERPL-SCNC: 132 MMOL/L (ref 136–145)
TRIGL SERPL-MCNC: 35 MG/DL (ref 30–149)
TSI SER-ACNC: 1.7 MIU/ML (ref 0.36–3.74)
VLDLC SERPL CALC-MCNC: 6 MG/DL (ref 0–30)
WBC # BLD AUTO: 9 X10(3) UL (ref 4–11)

## 2024-01-17 PROCEDURE — 80061 LIPID PANEL: CPT

## 2024-01-17 PROCEDURE — 86038 ANTINUCLEAR ANTIBODIES: CPT

## 2024-01-17 PROCEDURE — 82728 ASSAY OF FERRITIN: CPT

## 2024-01-17 PROCEDURE — 82627 DEHYDROEPIANDROSTERONE: CPT

## 2024-01-17 PROCEDURE — 80053 COMPREHEN METABOLIC PANEL: CPT

## 2024-01-17 PROCEDURE — 84146 ASSAY OF PROLACTIN: CPT

## 2024-01-17 PROCEDURE — 86225 DNA ANTIBODY NATIVE: CPT

## 2024-01-17 PROCEDURE — 86039 ANTINUCLEAR ANTIBODIES (ANA): CPT

## 2024-01-17 PROCEDURE — 83002 ASSAY OF GONADOTROPIN (LH): CPT

## 2024-01-17 PROCEDURE — 84410 TESTOSTERONE BIOAVAILABLE: CPT

## 2024-01-17 PROCEDURE — 85025 COMPLETE CBC W/AUTO DIFF WBC: CPT

## 2024-01-17 PROCEDURE — 84443 ASSAY THYROID STIM HORMONE: CPT

## 2024-01-17 PROCEDURE — 85652 RBC SED RATE AUTOMATED: CPT

## 2024-01-17 PROCEDURE — 36415 COLL VENOUS BLD VENIPUNCTURE: CPT

## 2024-01-17 PROCEDURE — 86235 NUCLEAR ANTIGEN ANTIBODY: CPT

## 2024-01-17 PROCEDURE — 83001 ASSAY OF GONADOTROPIN (FSH): CPT

## 2024-01-18 ENCOUNTER — PATIENT MESSAGE (OUTPATIENT)
Dept: FAMILY MEDICINE CLINIC | Facility: CLINIC | Age: 24
End: 2024-01-18

## 2024-01-18 DIAGNOSIS — E87.1 HYPONATREMIA: Primary | ICD-10-CM

## 2024-01-18 LAB — NUCLEAR IGG TITR SER IF: POSITIVE {TITER}

## 2024-01-18 NOTE — TELEPHONE ENCOUNTER
Non specific mild depressed sodium and osmolality  Ok to recheck CMP in 3-4 weeks  Rest of labs stable

## 2024-01-18 NOTE — TELEPHONE ENCOUNTER
From: Lulú Pulliam  To: Gideon Mccormick  Sent: 1/18/2024 10:06 AM CST  Subject: CMP    Hello, took a look at my CMP. It looks like my sodium and osmolality were on the lower end. I don’t feel that I’m drinking too much water.. if anything I feel that I’m not drinking enough due to working 12 hour night shifts. So I’m not sure if I may be retaining fluid or if there is something going on with my ADH secretion. Do you think these results warrant looking further into it?

## 2024-01-19 ENCOUNTER — LAB ENCOUNTER (OUTPATIENT)
Dept: LAB | Age: 24
End: 2024-01-19
Attending: EMERGENCY MEDICINE
Payer: COMMERCIAL

## 2024-01-19 DIAGNOSIS — E87.1 HYPONATREMIA: ICD-10-CM

## 2024-01-19 DIAGNOSIS — F90.2 ATTENTION DEFICIT HYPERACTIVITY DISORDER (ADHD), COMBINED TYPE: ICD-10-CM

## 2024-01-19 LAB
AMPHET UR QL SCN: NEGATIVE
ANA NUCLEOLAR TITR SER IF: 640 {TITER}
BENZODIAZ UR QL SCN: NEGATIVE
COCAINE UR QL: NEGATIVE
CREAT UR-SCNC: 282 MG/DL
DSDNA AB TITR SER: <10 {TITER}
HCG UR QL: NEGATIVE
MDMA UR QL SCN: NEGATIVE
OPIATES UR QL SCN: NEGATIVE
OXYCODONE UR QL SCN: NEGATIVE
SODIUM SERPL-SCNC: 153 MMOL/L

## 2024-01-19 PROCEDURE — 81025 URINE PREGNANCY TEST: CPT

## 2024-01-19 PROCEDURE — 80349 CANNABINOIDS NATURAL: CPT

## 2024-01-19 PROCEDURE — 84300 ASSAY OF URINE SODIUM: CPT

## 2024-01-19 PROCEDURE — 83935 ASSAY OF URINE OSMOLALITY: CPT

## 2024-01-19 PROCEDURE — 80307 DRUG TEST PRSMV CHEM ANLYZR: CPT

## 2024-01-20 LAB — OSMOLALITY UR: 1122 MOSM/KG (ref 300–1300)

## 2024-01-22 LAB
CENTROMERE IGG SER-ACNC: 0.4 U/ML
ENA JO1 AB SER IA-ACNC: <0.3 U/ML
ENA RNP IGG SER IA-ACNC: 1.8 U/ML
ENA SCL70 IGG SER IA-ACNC: 0.6 U/ML
ENA SM IGG SER IA-ACNC: <0.7 U/ML
ENA SS-A IGG SER IA-ACNC: <0.4 U/ML
ENA SS-B IGG SER IA-ACNC: <0.4 U/ML
U1 SNRNP IGG SER IA-ACNC: 2.2 U/ML

## 2024-01-23 ENCOUNTER — OFFICE VISIT (OUTPATIENT)
Dept: NEPHROLOGY | Facility: CLINIC | Age: 24
End: 2024-01-23

## 2024-01-23 VITALS
DIASTOLIC BLOOD PRESSURE: 70 MMHG | WEIGHT: 131 LBS | SYSTOLIC BLOOD PRESSURE: 114 MMHG | HEIGHT: 63 IN | HEART RATE: 74 BPM | BODY MASS INDEX: 23.21 KG/M2

## 2024-01-23 DIAGNOSIS — E87.1 HYPONATREMIA: Primary | ICD-10-CM

## 2024-01-23 LAB
SEX HORM BIND GLOB: 27.4 NMOL/L
TESTOST % FREE+WEAK BND: 27.4 %
TESTOST FREE+WEAK BND: 17.7 NG/DL
TESTOSTERONE TOT /MS: 64.6 NG/DL

## 2024-01-23 PROCEDURE — 99204 OFFICE O/P NEW MOD 45 MIN: CPT | Performed by: INTERNAL MEDICINE

## 2024-01-23 PROCEDURE — 3078F DIAST BP <80 MM HG: CPT | Performed by: INTERNAL MEDICINE

## 2024-01-23 PROCEDURE — 3074F SYST BP LT 130 MM HG: CPT | Performed by: INTERNAL MEDICINE

## 2024-01-23 PROCEDURE — 3008F BODY MASS INDEX DOCD: CPT | Performed by: INTERNAL MEDICINE

## 2024-01-23 NOTE — PROGRESS NOTES
Nephrology Consultation Note    Reason for Consult:   Chief Complaint   Patient presents with    Consult     Hyponatremia        HPI:     23 year old female with past medical history of anxiety/depression, PCOS, and possible ADHD is referred by Dr Jace WELLS for hyponatremia.     2016: Sodium 139 12/30 2021:  Sodium 138 11/2 2022:  Sodium 141 4/26 2024:  Sodium 132 1/17    Urine sodium 153 and Urine os 1122 1/17/24.   TSH 1.7 1/17/24.     She states she drinks about 1L of water and about 16 oz of coffee per day. She has not increased fluid intake recently.   Denies N/V/D.   Reports decreased appetite and has had weight loss over 1 year. Also complains of thinning of hair.     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 She is very anxious about hyponatremia.     She was recently started on Vyvanse for possible ADHD, but has not started taking yet.   She is currently not taking any meds. Has not taken any SSRIs recently.    Additional labs:   Hb 14 1/2024.  LAURA positive, reflex serologies all negative 1/2024.       FHx:   Mother (A) - ADHD  Father (A) - healthy  MGM (D) - DM  Brothers x 2 - anxiety/depression  No family history of hyponatremia.      SHx: Denies smoking. Drinks alcohol occasionally.  Works as an ICU nurse at [employer].   Not . Does not have children.       HISTORY:  Past Medical History:   Diagnosis Date    Acne     ADHD     Anxiety     Depression     PCOS (polycystic ovarian syndrome)       Past Surgical History:   Procedure Laterality Date    ADENOIDECTOMY      REMOVAL OF TONSILS,12+ Y/O      TONSILLECTOMY        Family History   Problem Relation Age of Onset    ADHD Mother     Cancer Maternal Grandfather         Bone unknown Primary    Diabetes Maternal Grandfather     Colon Cancer Other         MGGF, MGGM    Diabetes Maternal Grandmother     Anxiety Brother     Depression Brother     Anxiety Brother     Depression Brother       Social History:   Social  History     Socioeconomic History    Marital status: Single   Tobacco Use    Smoking status: Never    Smokeless tobacco: Never   Vaping Use    Vaping Use: Never used   Substance and Sexual Activity    Alcohol use: Yes     Alcohol/week: 2.0 standard drinks of alcohol     Types: 2 Glasses of wine per week     Comment: rare, social    Drug use: Not Currently     Types: Cannabis    Sexual activity: Yes     Partners: Male     Birth control/protection: Condom   Other Topics Concern    Caffeine Concern Yes    Exercise Yes    Seat Belt Yes    Special Diet No    Stress Concern No    Weight Concern Yes        Medications (Active prior to today's visit):  Current Outpatient Medications   Medication Sig Dispense Refill    lisdexamfetamine 40 MG Oral Cap Take 1 capsule (40 mg total) by mouth every morning. (Patient not taking: Reported on 1/23/2024) 30 capsule 0       Allergies:  No Known Allergies      ROS:     Review of Systems   Constitutional:  Positive for unexpected weight change. Negative for appetite change, chills, fatigue and fever.   HENT:  Negative for ear pain, rhinorrhea, sore throat and trouble swallowing.    Eyes:  Negative for pain and discharge.   Respiratory:  Negative for cough, chest tightness and shortness of breath.    Cardiovascular:  Negative for chest pain and leg swelling.   Gastrointestinal:  Negative for abdominal pain, constipation, diarrhea and vomiting.   Genitourinary:  Negative for decreased urine volume, dysuria and flank pain.   Musculoskeletal:  Negative for arthralgias, back pain, gait problem and myalgias.   Skin:  Negative for rash.   Neurological:  Negative for dizziness, speech difficulty, weakness, numbness and headaches.   Psychiatric/Behavioral:  Negative for agitation and confusion. The patient is nervous/anxious.           Vitals:    01/23/24 1053   BP: 114/70   Pulse: 74       PHYSICAL EXAM:   Physical Exam  Constitutional:       Appearance: Normal appearance.   HENT:      Head:  Normocephalic and atraumatic.      Nose: Nose normal.   Eyes:      General: No scleral icterus.  Cardiovascular:      Rate and Rhythm: Normal rate and regular rhythm.      Heart sounds: Normal heart sounds. No murmur heard.  Pulmonary:      Effort: Pulmonary effort is normal.      Breath sounds: Normal breath sounds.   Abdominal:      General: Bowel sounds are normal. There is no distension.      Palpations: Abdomen is soft.   Musculoskeletal:         General: No tenderness. Normal range of motion.      Cervical back: Normal range of motion and neck supple.      Comments: Neg edema   Skin:     General: Skin is warm and dry.      Findings: No rash.   Neurological:      General: No focal deficit present.      Mental Status: She is alert and oriented to person, place, and time. Mental status is at baseline.   Psychiatric:         Mood and Affect: Mood normal.         Behavior: Behavior normal.         Thought Content: Thought content normal.             ASSESSMENT/PLAN:   Assessment   Encounter Diagnosis   Name Primary?    Hyponatremia Yes       She has no history of hyponatremia in the past. Denies hypovolemic symptoms. Not taking any meds currently.   Will repeat BMP to make sure it is not a lab error.   Will check Sosm, Uosm, Usodium and am cortisol.   Further workup depending on the lab results.      .   Follow up depending on the above labs.        Orders This Visit:  No orders of the defined types were placed in this encounter.      Meds This Visit:  Requested Prescriptions      No prescriptions requested or ordered in this encounter       Imaging & Referrals:  None       45 minutes spent in the care of the patient which included: reviewing prior records, obtaining history and examining patient, discussing lab results, ordering labs, and documentation.      Zach Saldaña MD  1/23/2024

## 2024-01-24 ENCOUNTER — LAB ENCOUNTER (OUTPATIENT)
Dept: LAB | Age: 24
End: 2024-01-24
Attending: INTERNAL MEDICINE
Payer: COMMERCIAL

## 2024-01-24 DIAGNOSIS — E87.1 HYPONATREMIA: ICD-10-CM

## 2024-01-24 LAB
ANION GAP SERPL CALC-SCNC: 6 MMOL/L (ref 0–18)
BILIRUB UR QL STRIP.AUTO: NEGATIVE
BUN BLD-MCNC: 14 MG/DL (ref 9–23)
CALCIUM BLD-MCNC: 9.1 MG/DL (ref 8.5–10.1)
CARBOXY THC GCMS UR: 58 NG/MG CREAT
CARBOXY THC GCMS UR: 58 NG/MG CREAT
CHLORIDE SERPL-SCNC: 106 MMOL/L (ref 98–112)
CO2 SERPL-SCNC: 28 MMOL/L (ref 21–32)
COLOR UR AUTO: YELLOW
CORTIS SERPL-MCNC: 14.8 UG/DL
CREAT BLD-MCNC: 0.6 MG/DL
EGFRCR SERPLBLD CKD-EPI 2021: 129 ML/MIN/1.73M2 (ref 60–?)
FASTING STATUS PATIENT QL REPORTED: NO
GLUCOSE BLD-MCNC: 93 MG/DL (ref 70–99)
GLUCOSE UR STRIP.AUTO-MCNC: NORMAL MG/DL
KETONES UR STRIP.AUTO-MCNC: 40 MG/DL
LEUKOCYTE ESTERASE UR QL STRIP.AUTO: 75
NITRITE UR QL STRIP.AUTO: NEGATIVE
OSMOLALITY SERPL CALC.SUM OF ELEC: 290 MOSM/KG (ref 275–295)
OSMOLALITY SERPL: 287 MOSM/KG (ref 280–300)
OSMOLALITY UR: 1202 MOSM/KG (ref 300–1300)
PH UR STRIP.AUTO: 5 [PH] (ref 5–8)
POTASSIUM SERPL-SCNC: 3.5 MMOL/L (ref 3.5–5.1)
PROT UR STRIP.AUTO-MCNC: 20 MG/DL
SODIUM SERPL-SCNC: 140 MMOL/L (ref 136–145)
SODIUM SERPL-SCNC: 59 MMOL/L
SP GR UR STRIP.AUTO: >1.03 (ref 1–1.03)
UROBILINOGEN UR STRIP.AUTO-MCNC: NORMAL MG/DL

## 2024-01-24 PROCEDURE — 83935 ASSAY OF URINE OSMOLALITY: CPT

## 2024-01-24 PROCEDURE — 83930 ASSAY OF BLOOD OSMOLALITY: CPT

## 2024-01-24 PROCEDURE — 84300 ASSAY OF URINE SODIUM: CPT

## 2024-01-24 PROCEDURE — 36415 COLL VENOUS BLD VENIPUNCTURE: CPT

## 2024-01-24 PROCEDURE — 82533 TOTAL CORTISOL: CPT

## 2024-01-24 PROCEDURE — 80048 BASIC METABOLIC PNL TOTAL CA: CPT

## 2024-01-24 PROCEDURE — 81001 URINALYSIS AUTO W/SCOPE: CPT

## 2024-01-25 ENCOUNTER — TELEPHONE (OUTPATIENT)
Dept: NEPHROLOGY | Facility: CLINIC | Age: 24
End: 2024-01-25

## 2024-01-25 NOTE — TELEPHONE ENCOUNTER
Called pt and left a VM with lab results.   Repeat sodium is 140, within range.   No further workup needed at this point from my standpoint.

## 2024-01-29 ENCOUNTER — OFFICE VISIT (OUTPATIENT)
Dept: RHEUMATOLOGY | Facility: CLINIC | Age: 24
End: 2024-01-29
Payer: COMMERCIAL

## 2024-01-29 VITALS
HEIGHT: 63 IN | SYSTOLIC BLOOD PRESSURE: 116 MMHG | DIASTOLIC BLOOD PRESSURE: 74 MMHG | BODY MASS INDEX: 22.86 KG/M2 | WEIGHT: 129 LBS | HEART RATE: 80 BPM

## 2024-01-29 DIAGNOSIS — R76.8 POSITIVE ANA (ANTINUCLEAR ANTIBODY): Primary | ICD-10-CM

## 2024-01-29 PROCEDURE — 3078F DIAST BP <80 MM HG: CPT | Performed by: INTERNAL MEDICINE

## 2024-01-29 PROCEDURE — 99204 OFFICE O/P NEW MOD 45 MIN: CPT | Performed by: INTERNAL MEDICINE

## 2024-01-29 PROCEDURE — 3008F BODY MASS INDEX DOCD: CPT | Performed by: INTERNAL MEDICINE

## 2024-01-29 PROCEDURE — 3074F SYST BP LT 130 MM HG: CPT | Performed by: INTERNAL MEDICINE

## 2024-01-29 NOTE — PATIENT INSTRUCTIONS
You were seen today for a positive LAURA  Further blood work for lupus, Sjogren's were negative  No evidence of autoimmune disease based on blood work and exam  Watch out for any worsening joint pain with swelling, rashes, sores in the mouth,

## 2024-01-29 NOTE — PROGRESS NOTES
Lulú Pulliam is a 23 year old female who presents for   Chief Complaint   Patient presents with    Consult     NP referred by  for Positive LAURA and Titer    .   HPI:   CC: +LAURA  Consult: referred by PCP Dr. Gideon Mccormick    This is a 24 yo F with hx of PCOS, Migraine's, Anxiety for a positive LAURA.  She seen by her PCP for annual physical and workup was done showing a positive LAURA.  At that time she was having a lot of hair loss.  Thyroid and ferritin were normal.  She also reports dry eyes and dry mouth.  She has fissures on her tongue.  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  Denies any joint pain or swelling but states that her joints crack a lot especially in her knees, fingers, back and ankles.  She has some discomfort in her joints but again not a lot of pain.  Denies any sores in the mouth, serositis, DVT or PE, miscarriages or RP.  No family history of autoimmune diseases.  Currently does not take any pain medications.    Blood work:  +LAURA 1:640 homogenous  Neg BRENDA panel, ESR  Normal CBC and CMP    Wt Readings from Last 2 Encounters:   01/29/24 129 lb (58.5 kg)   01/23/24 131 lb (59.4 kg)     Body mass index is 22.85 kg/m².      Current Outpatient Medications   Medication Sig Dispense Refill    lisdexamfetamine 40 MG Oral Cap Take 1 capsule (40 mg total) by mouth every morning. 30 capsule 0      Past Medical History:   Diagnosis Date    Acne     ADHD     Anxiety     Depression     PCOS (polycystic ovarian syndrome)       Past Surgical History:   Procedure Laterality Date    ADENOIDECTOMY      REMOVAL OF TONSILS,12+ Y/O      TONSILLECTOMY        Family History   Problem Relation Age of Onset    ADHD Mother     Cancer Maternal Grandfather         Bone unknown Primary    Diabetes Maternal Grandfather     Colon Cancer Other         MGGF, MGGM    Diabetes Maternal Grandmother     Anxiety Brother     Depression Brother     Anxiety Brother     Depression  Brother       Social History:  Social History     Socioeconomic History    Marital status: Single   Tobacco Use    Smoking status: Never    Smokeless tobacco: Never   Vaping Use    Vaping Use: Never used   Substance and Sexual Activity    Alcohol use: Yes     Alcohol/week: 2.0 standard drinks of alcohol     Types: 2 Glasses of wine per week     Comment: rare, social    Drug use: Not Currently     Types: Cannabis    Sexual activity: Yes     Partners: Male     Birth control/protection: Condom   Other Topics Concern    Caffeine Concern Yes    Exercise Yes    Seat Belt Yes    Special Diet No    Stress Concern No    Weight Concern Yes           REVIEW OF SYSTEMS:   Review Of Systems:  Constitutional: No fever, no change in weight or appetitie  Derm: No rashes, no oral ulcers, no alopecia, no photosensitivity, no psoriasis  HEENT: + dry eyes, + dry mouth, no Raynaud's, no nasal ulcers, no parotid swelling, no neck pain, no jaw pain, no temple pain  Eyes: No visual changes,   CVS: No chest pain, no heart disease  RS: No SOB, no Cough, No Pleurtic pain,   GI: No nausea, no vomiiting, no abominal pain, no hx of ulcer, no gastritis, no heartburn, no dyshpagia, no BRBPR or melena  : no dysuria, no hx of miscarriages, no DVT Hx, no hx of OCP,   Neuro: No numbness or tingling, no headache, no hx of seizures,   Psych: no hx of anxiety or depression  ENDO: no hx of thyroid disease, no hx of DM  Joint/Muscluskeltal: see HPI,   All other ROS are negative.     EXAM:   /74 (BP Location: Left arm, Patient Position: Sitting, Cuff Size: adult)   Pulse 80   Ht 5' 3\" (1.6 m)   Wt 129 lb (58.5 kg)   LMP 01/01/2024 (Exact Date)   BMI 22.85 kg/m²   GEN: AAOx3, NAD  HEENT: EOMI, PERRLA, no injection or icterus, oral mucosa moist, no oral lesions. No lymphadenopathy. No facial rash  CVS: RRR, no murmurs rubs or gallops. Equal 2+ distal pulses.   LUNGS: CTAB, no increased work of breathing  ABDOMEN:  soft NT/ND, +BS, no HSM  SKIN:  No rashes or skin lesions. No nail findings  MSK:  Cervical spine: FROM  Hands: no synovitis in DIP, PIP and MCP, strong full fists  Wrist: FROM, no pain or swelling or warmth on palpation  Elbow: FROM, no pain or swelling or warmth on palpation  Shoulders: FROM, no pain or swelling or warmth on palpation  Hip: normal log roll, no lateral hip pain, J CARLOS test negative b/l  Knees: FROM, no warmth or effusion present. No pain with ROM.   Ankles: FROM, no pain or swelling or warmth on palpation  Feet: no pain with MTP squeeze, no toe swelling or pain or warmth on palpation with FROM  Spine: no lumbar or sacral pain on palpation.  NEURO: Cranial nerves II-XII intact grossly. 5/5 strength throughout in both upper and lower extremities, sensation intact.  PSYCH: normal mood    LABS:     Component      Latest Ref Rn 1/17/2024   Anti-SSA Antibody, IGG      <7 U/mL <0.4    Anti-SSB Antibody, IGG      <7 U/mL <0.4    Anti-Smith Antibody, IGG      <7 U/mL <0.7    Anti-U1RNP Antibody, IGG      <5 U/mL 2.2    Anti-RNP70 Antibody, IGG      <7 U/mL 1.8    Anti-Centromere Antibody, IGG      <7 U/mL 0.4    Anti-SCL70 Antibody, IGG      <7 U/mL 0.6    Anti-Ramona-1 Antibody, IGG      <7 U/mL <0.3    LAURA Titer/Pattern      <80  640 !    Reviewed By: Ky Alvarez M.D.    LAURA SCREEN WITH REFLEX (S)      Negative  Positive !    SED RATE      0 - 20 mm/Hr 4    Anti Double Strand DNA      <10  <10         IMAGING:     None    ASSESSMENT AND PLAN:     Positive LAURA  - She was found to have a positive LAURA 1: 640 homogenous pattern.  Further blood work showed negative BRENDA panel.  Normal ESR.  She reports some symptoms of hair loss, dry eyes and dry mouth but no other symptoms of connective tissue disease.  - I explained that the LAURA is a non-specific test and does not necessarily indicate a diagnosis of autoimmune rheumatologic diesease. For example, autoantibodies also are detected in patients with organ-specific autoimmune diseases, such as  autoimmune thyroiditis and hepatitis, certain infections such as hepatitis, and malignancies. In general, 20-30% of normal patients may have an LAURA titer of 1:40, 10-12% may have a titer of 1:80, and about 5% may have a titer of greater than or equal to 1:160     Thank you for allowing me to participate in this patients care.     Nellie Escobedo MD  1/29/2024  11:13 AM

## 2024-02-23 ENCOUNTER — LAB ENCOUNTER (OUTPATIENT)
Dept: LAB | Age: 24
End: 2024-02-23
Attending: EMERGENCY MEDICINE
Payer: COMMERCIAL

## 2024-02-23 DIAGNOSIS — R35.0 URINARY FREQUENCY: ICD-10-CM

## 2024-02-23 PROCEDURE — 87086 URINE CULTURE/COLONY COUNT: CPT

## 2024-06-03 ENCOUNTER — TELEMEDICINE (OUTPATIENT)
Dept: FAMILY MEDICINE CLINIC | Facility: CLINIC | Age: 24
End: 2024-06-03
Payer: COMMERCIAL

## 2024-06-03 DIAGNOSIS — Z91.89 AT INCREASED RISK FOR EXPOSURE TO HEPATITIS B VIRUS: Primary | ICD-10-CM

## 2024-06-03 NOTE — PROGRESS NOTES
This is a telemedicine visit with live, interactive video and audio.     Lulú Pulliam verbally consents to a Virtual/Telephone Check-In visit on 06/03/24.    Patient understands and accepts financial responsibility for any deductible, co-insurance and/or co-pays associated with this service.    Duration of the service: 25 minutes    SUBJECTIVE      Worried about Hepatitis B. Old college roommate recently told her about being Hep B +.   Reports that she has been immunized with Hep B and repeat titers always have showed no immunity. Has also had repeat Hep B immunization    Denies any IV drug use  No new Sx  No abd pain BMs regular            HISTORY:  Past Medical History:    Acne    ADHD    Anxiety    Depression    PCOS (polycystic ovarian syndrome)      Past Surgical History:   Procedure Laterality Date    Adenoidectomy      Removal of tonsils,12+ y/o      Tonsillectomy        Family History   Problem Relation Age of Onset    ADHD Mother     Cancer Maternal Grandfather         Bone unknown Primary    Diabetes Maternal Grandfather     Colon Cancer Other         MGGF, MGGM    Diabetes Maternal Grandmother     Anxiety Brother     Depression Brother     Anxiety Brother     Depression Brother       Social History     Socioeconomic History    Marital status: Single   Tobacco Use    Smoking status: Never    Smokeless tobacco: Never   Vaping Use    Vaping status: Never Used   Substance and Sexual Activity    Alcohol use: Yes     Alcohol/week: 2.0 standard drinks of alcohol     Types: 2 Glasses of wine per week     Comment: rare, social    Drug use: Not Currently     Types: Cannabis    Sexual activity: Yes     Partners: Male     Birth control/protection: Condom   Other Topics Concern    Caffeine Concern Yes    Exercise Yes    Seat Belt Yes    Special Diet No    Stress Concern No    Weight Concern Yes        No Known Allergies   Current Outpatient Medications   Medication Sig Dispense Refill    Lisdexamfetamine  Dimesylate (VYVANSE) 60 MG Oral Cap Take 1 capsule (60 mg total) by mouth daily. 30 capsule 0    [START ON 7/1/2024] Lisdexamfetamine Dimesylate (VYVANSE) 60 MG Oral Cap Take 1 capsule (60 mg total) by mouth daily. 30 capsule 0       OBJECTIVE  Physical Exam:   alert, appears stated age, and cooperative, lips, mucosa, and tongue normal; teeth and gums normal, Speaking in full sentences comfortably, Normal work of breathing, and Skin color, texture, turgor normal. No rashes or lesions    ASSESSMENT & PLAN  Diagnoses and all orders for this visit:    At increased risk for exposure to hepatitis B virus  -     Hepatitis Panel, Acute (4); Future  -     Hepatitis B Surface Antibody; Future  -     Hepatitis B Surface Antigen [E]; Future  -     Hep B Core Ab, IGM [E]; Future    Labs discussed  Pt reassured.   Ok to check Hep B titers  Pt to send us recent Hep B immunizations and Hep B blood roger done in the past          Gideon Mccormick MD

## 2024-06-07 ENCOUNTER — PATIENT MESSAGE (OUTPATIENT)
Dept: FAMILY MEDICINE CLINIC | Facility: CLINIC | Age: 24
End: 2024-06-07

## 2024-06-07 ENCOUNTER — LAB ENCOUNTER (OUTPATIENT)
Dept: LAB | Age: 24
End: 2024-06-07
Attending: EMERGENCY MEDICINE
Payer: COMMERCIAL

## 2024-06-07 DIAGNOSIS — Z91.89 AT INCREASED RISK FOR EXPOSURE TO HEPATITIS B VIRUS: ICD-10-CM

## 2024-06-07 DIAGNOSIS — F90.2 ATTENTION DEFICIT HYPERACTIVITY DISORDER (ADHD), COMBINED TYPE: ICD-10-CM

## 2024-06-07 DIAGNOSIS — E87.1 HYPONATREMIA: ICD-10-CM

## 2024-06-07 LAB
ALBUMIN SERPL-MCNC: 4.2 G/DL (ref 3.4–5)
ALBUMIN/GLOB SERPL: 1.4 {RATIO} (ref 1–2)
ALP LIVER SERPL-CCNC: 59 U/L
ALT SERPL-CCNC: 21 U/L
ANION GAP SERPL CALC-SCNC: 8 MMOL/L (ref 0–18)
AST SERPL-CCNC: 20 U/L (ref 15–37)
BILIRUB SERPL-MCNC: 0.5 MG/DL (ref 0.1–2)
BUN BLD-MCNC: 12 MG/DL (ref 9–23)
CALCIUM BLD-MCNC: 9.2 MG/DL (ref 8.5–10.1)
CHLORIDE SERPL-SCNC: 107 MMOL/L (ref 98–112)
CO2 SERPL-SCNC: 26 MMOL/L (ref 21–32)
CREAT BLD-MCNC: 0.65 MG/DL
EGFRCR SERPLBLD CKD-EPI 2021: 126 ML/MIN/1.73M2 (ref 60–?)
FASTING STATUS PATIENT QL REPORTED: YES
GLOBULIN PLAS-MCNC: 3.1 G/DL (ref 2.8–4.4)
GLUCOSE BLD-MCNC: 90 MG/DL (ref 70–99)
HAV IGM SER QL: NONREACTIVE
HBV CORE IGM SER QL: NONREACTIVE
HBV SURFACE AB SER QL: REACTIVE
HBV SURFACE AB SERPL IA-ACNC: 147.37 MIU/ML
HBV SURFACE AG SERPL QL IA: NONREACTIVE
HCV AB SERPL QL IA: NONREACTIVE
OSMOLALITY SERPL CALC.SUM OF ELEC: 291 MOSM/KG (ref 275–295)
POTASSIUM SERPL-SCNC: 4.5 MMOL/L (ref 3.5–5.1)
PROT SERPL-MCNC: 7.3 G/DL (ref 6.4–8.2)
SODIUM SERPL-SCNC: 141 MMOL/L (ref 136–145)

## 2024-06-07 PROCEDURE — 80074 ACUTE HEPATITIS PANEL: CPT

## 2024-06-07 PROCEDURE — 80053 COMPREHEN METABOLIC PANEL: CPT

## 2024-06-07 PROCEDURE — 36415 COLL VENOUS BLD VENIPUNCTURE: CPT

## 2024-06-07 PROCEDURE — 86706 HEP B SURFACE ANTIBODY: CPT

## 2024-06-07 RX ORDER — LISDEXAMFETAMINE DIMESYLATE 60 MG/1
60 CAPSULE ORAL DAILY
Qty: 30 CAPSULE | Refills: 0 | OUTPATIENT
Start: 2024-06-07 | End: 2024-07-07

## 2024-06-07 NOTE — TELEPHONE ENCOUNTER
Lisdexamfetamine Dimesylate (VYVANSE) 60 MG Oral Cap 30 capsule 0 5/31/2024 6/30/2024     Lisdexamfetamine Dimesylate (VYVANSE) 60 MG Oral Cap 30 capsule 0 7/1/2024 7/31/2024     Patient has scripts at the pharmacy

## 2024-06-07 NOTE — TELEPHONE ENCOUNTER
From: Lulú Pulliam  To: Gideon Mccormick  Sent: 6/7/2024 2:40 PM CDT  Subject: Vyvanse refill    Why did my refills request get denied? I had my follow up visit and was not scheduled for another for 3 months?

## 2024-06-12 DIAGNOSIS — M54.2 NECK PAIN: Primary | ICD-10-CM

## 2024-06-13 ENCOUNTER — OFFICE VISIT (OUTPATIENT)
Dept: ORTHOPEDICS CLINIC | Facility: CLINIC | Age: 24
End: 2024-06-13
Payer: COMMERCIAL

## 2024-06-13 ENCOUNTER — HOSPITAL ENCOUNTER (OUTPATIENT)
Dept: GENERAL RADIOLOGY | Age: 24
Discharge: HOME | End: 2024-06-13
Attending: STUDENT IN AN ORGANIZED HEALTH CARE EDUCATION/TRAINING PROGRAM
Payer: COMMERCIAL

## 2024-06-13 VITALS — HEIGHT: 63 IN | WEIGHT: 125 LBS | BODY MASS INDEX: 22.15 KG/M2

## 2024-06-13 DIAGNOSIS — G89.29 CHRONIC MIDLINE THORACIC BACK PAIN: ICD-10-CM

## 2024-06-13 DIAGNOSIS — M54.6 CHRONIC MIDLINE THORACIC BACK PAIN: ICD-10-CM

## 2024-06-13 DIAGNOSIS — M54.2 NECK PAIN: ICD-10-CM

## 2024-06-13 DIAGNOSIS — M54.50 LOW BACK PAIN, UNSPECIFIED BACK PAIN LATERALITY, UNSPECIFIED CHRONICITY, UNSPECIFIED WHETHER SCIATICA PRESENT: ICD-10-CM

## 2024-06-13 DIAGNOSIS — M54.50 LOW BACK PAIN, UNSPECIFIED BACK PAIN LATERALITY, UNSPECIFIED CHRONICITY, UNSPECIFIED WHETHER SCIATICA PRESENT: Primary | ICD-10-CM

## 2024-06-13 PROCEDURE — 99204 OFFICE O/P NEW MOD 45 MIN: CPT | Performed by: STUDENT IN AN ORGANIZED HEALTH CARE EDUCATION/TRAINING PROGRAM

## 2024-06-13 PROCEDURE — 72100 X-RAY EXAM L-S SPINE 2/3 VWS: CPT | Performed by: STUDENT IN AN ORGANIZED HEALTH CARE EDUCATION/TRAINING PROGRAM

## 2024-06-13 RX ORDER — MELOXICAM 15 MG/1
15 TABLET ORAL DAILY
Qty: 30 TABLET | Refills: 0 | Status: SHIPPED | OUTPATIENT
Start: 2024-06-13

## 2024-06-13 NOTE — H&P
[facility] - ORTHOPEDICS  [address] [place] [address], [address] [address]  [address] [place] [id number]  [phone]     NEW PATIENT VISIT - HISTORY AND PHYSICAL EXAMINATION     Name: Lulú Pulliam   MRN: [id number]  Date: 06/13/24       CC: mid and low back pain    REFERRED BY: Gideon Mccormick MD    HPI:   Lulú Pulliam is a very pleasant 24 year old female who presents today for evaluation of back pain. The distribution of symptoms are: 100% backpain and 0% leg pain. The symptoms began many year(s) ago without any significant injury. Since the onset, the symptoms have become slowly worse over time. Patient feels pain is aggravated by bending, lifting and improved by rest. The patient reports no numbness and no weakness.  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     Prior spine surgery: none.    Bowel and bladder symptoms: absent.    The patient has not had issues with balance and/or hand dexterity problems such as changes in penmanship or the use of buttons or zippers.    Treatment up to this time has included:    Evaluation: PCP  NSAIDS: have worked well  Narcotic use: None  Physical therapy: None  Spinal injections: None  Others:       PMH:   Past Medical History:    Acne    ADHD    Anxiety    Depression    PCOS (polycystic ovarian syndrome)       PAST SURGICAL HX:  Past Surgical History:   Procedure Laterality Date    Adenoidectomy      Removal of tonsils,12+ y/o      Tonsillectomy         FAMILY HX:  Family History   Problem Relation Age of Onset    ADHD Mother     Cancer Maternal Grandfather         Bone unknown Primary    Diabetes Maternal Grandfather     Colon Cancer Other         MGGF, MGGM    Diabetes Maternal Grandmother     Anxiety Brother     Depression Brother     Anxiety Brother     Depression Brother        ALLERGIES:  Patient has no known allergies.    MEDICATIONS:   Current Outpatient  Medications   Medication Sig Dispense Refill    Meloxicam 15 MG Oral Tab Take 1 tablet (15 mg total) by mouth daily. 30 tablet 0    Lisdexamfetamine Dimesylate (VYVANSE) 60 MG Oral Cap Take 1 capsule (60 mg total) by mouth daily. 30 capsule 0    [START ON 7/1/2024] Lisdexamfetamine Dimesylate (VYVANSE) 60 MG Oral Cap Take 1 capsule (60 mg total) by mouth daily. 30 capsule 0       ROS: A comprehensive 14 point review of systems was performed and was negative aside from the aforementioned per history of present illness.    SOCIAL HX:  Social History     Tobacco Use    Smoking status: Never    Smokeless tobacco: Never   Substance Use Topics    Alcohol use: Yes     Alcohol/week: 2.0 standard drinks of alcohol     Types: 2 Glasses of wine per week     Comment: rare, social         PE:   Vitals:    06/13/24 1018   Weight: 125 lb (56.7 kg)   Height: 5' 3\" (1.6 m)     Estimated body mass index is 22.14 kg/m² as calculated from the following:    Height as of this encounter: 5' 3\" (1.6 m).    Weight as of this encounter: 125 lb (56.7 kg).    Physical Exam  Constitutional:       Appearance: Normal appearance.   HENT:      Head: Normocephalic and atraumatic.   Eyes:      Extraocular Movements: Extraocular movements intact.   Cardiovascular:      Pulses: Normal pulses. Skin warm and well perfused.  Pulmonary:      Effort: Pulmonary effort is normal. No respiratory distress.   Skin:     General: Skin is warm.   Psychiatric:         Mood and Affect: Mood normal.     Spine Exam:    Normal gait without difficulty  Able to heel, toe, tandem gait without difficulty  Level shoulders and hips in even stance    Restricted L-spine ROM    No tenderness to palpation of L-spine    Straight leg raise test: negative    Sustained clonus: negative    LE Strength: 5/5 IP QUAD TA EHL GSC  LE Sensation: normal in L2-S1 distribution  LE reflexes: normal    Radiographic Examination/Diagnostics:  XR and CT personally viewed, independently interpreted  and radiology report was reviewed.  X-ray of the lumbar spine demonstrates no acute pathology, minimal scoliosis and degenerative changes  CT scan of the chest demonstrates no significant bony abnormalities in the thoracic spine    IMPRESSION: Lulú Pulliam is a 24 year old female with back pain    PLAN:     We reviewed the patients history, symptoms, exam findings, and imaging today.  We had a detailed discussion outlining the etiology, anatomy, pathophysiology, and natural history of back pain. The typical management of this condition may include lifestyle modification, NSAIDs, physical therapy, oral steroids, epidural injections, neuromodulatory medications, and sometimes pain medications.  Based on our discussion today we would like to have the patient initiate our recommendations for continued conservative therapy in the treatment of their condition noted in the assessment section.     - Provided home exercise program  - Referred to Physical Therapy: home exercise program, aerobic exercises, core strengthening and conditioning, possible manipulative therapy,  and modalities as indicated  - Prescribed Meloxicam  - Follow up if symptoms do not improve      Santosh Daly MD  Orthopedic Spine Surgeon  [facility]   [address] [place] [address].[facility]  [email or web address]  t: [phone]   f: [phone]        This note was dictated using Dragon software.  While it was briefly proofread prior to completion, some grammatical, spelling, and word choice errors due to dictation may still occur.

## 2024-07-11 ENCOUNTER — PATIENT MESSAGE (OUTPATIENT)
Dept: FAMILY MEDICINE CLINIC | Facility: CLINIC | Age: 24
End: 2024-07-11

## 2024-07-11 NOTE — TELEPHONE ENCOUNTER
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 I have no trouble sleeping regularly on my stretch of off days, but I have been getting only about 2-4 hours of sleep in between working 12 hours 7a-7p. Is it possible to get a prescription sent to the pharmacy for something to help me sleep, I used to take 50mg of trazodone in the past and that worked for me in the past. Or do I need to make an appointment for a visit? I have seen Dr. Mccormick in office often recently

## 2024-11-25 DIAGNOSIS — F90.2 ATTENTION DEFICIT HYPERACTIVITY DISORDER (ADHD), COMBINED TYPE: ICD-10-CM

## 2024-11-26 RX ORDER — LISDEXAMFETAMINE DIMESYLATE 60 MG/1
60 CAPSULE ORAL DAILY
Qty: 30 CAPSULE | Refills: 0 | OUTPATIENT
Start: 2024-11-26 | End: 2024-12-26

## 2024-11-26 NOTE — TELEPHONE ENCOUNTER
Spoke to pt, she discussed this medication with  on 10/01 in a video apt. She received a 3 month refill on vyvanse and is still on month 1. She will call back to make an apt.

## 2024-11-26 NOTE — TELEPHONE ENCOUNTER
Pt due for medication f/u - please call to schedule.     Averail message sent as well. Forwarded refill request to provider for approval.

## 2024-11-26 NOTE — TELEPHONE ENCOUNTER
Left message for patient to call office and clarify.    Medication was sent to [facility] for 3months. Is there a reason patient can not get medication from this [facility]. If changing pharmacy scripts need to be canceled and PCP will need to send script to new pharmacy.

## 2025-01-16 PROBLEM — F90.2 ATTENTION DEFICIT HYPERACTIVITY DISORDER (ADHD), COMBINED TYPE: Status: ACTIVE | Noted: 2025-01-16

## 2025-01-16 PROBLEM — F33.1 MAJOR DEPRESSIVE DISORDER, RECURRENT EPISODE, MODERATE (HCC): Status: RESOLVED | Noted: 2020-09-29 | Resolved: 2025-01-16

## 2025-04-22 ENCOUNTER — OFFICE VISIT (OUTPATIENT)
Dept: FAMILY MEDICINE CLINIC | Facility: CLINIC | Age: 25
End: 2025-04-22
Payer: COMMERCIAL

## 2025-04-22 VITALS
HEART RATE: 91 BPM | OXYGEN SATURATION: 98 % | RESPIRATION RATE: 16 BRPM | BODY MASS INDEX: 22 KG/M2 | SYSTOLIC BLOOD PRESSURE: 110 MMHG | TEMPERATURE: 98 F | DIASTOLIC BLOOD PRESSURE: 72 MMHG | WEIGHT: 126.38 LBS

## 2025-04-22 DIAGNOSIS — J02.9 SORE THROAT: Primary | ICD-10-CM

## 2025-04-22 LAB
CONTROL LINE PRESENT WITH A CLEAR BACKGROUND (YES/NO): YES YES/NO
KIT LOT #: NORMAL NUMERIC
STREP GRP A CUL-SCR: NEGATIVE

## 2025-04-22 PROCEDURE — 87880 STREP A ASSAY W/OPTIC: CPT | Performed by: NURSE PRACTITIONER

## 2025-04-22 PROCEDURE — 99213 OFFICE O/P EST LOW 20 MIN: CPT | Performed by: NURSE PRACTITIONER

## 2025-04-22 NOTE — PROGRESS NOTES
CHIEF COMPLAINT:     Chief Complaint   Patient presents with    Sore Throat     S/s/ for 8 day.  OTC throat spray used.  Post nasal drainage.          HPI:   Lulú Pulliam is a 25 year old female presents to clinic with complaint of sore throat. Patient has had for 8 days. Patient reports following associated symptoms: none. Denies nasal congestion, cough.    Denies fever, chills, rash, nausea, headache, ear pain.  Has history of strep throat. Friend tested positive for strep and were at concert together this past weekend.    Treating symptoms with throat spray.    Current Medications[1]   Past Medical History[2]   Social History:  Short Social Hx on File[3]     REVIEW OF SYSTEMS:   GENERAL HEALTH: feels well otherwise, good appetite  SKIN: denies any unusual skin lesions or rashes  HEENT: denies ear pain, See HPI  RESPIRATORY: denies shortness of breath or wheezing  CARDIOVASCULAR: denies chest pain or palpitations   GI: denies vomiting or diarrhea  NEURO: denies dizziness or lightheadedness    EXAM:   /72   Pulse 91   Temp 98.4 °F (36.9 °C) (Oral)   Resp 16   Wt 126 lb 6.4 oz (57.3 kg)   LMP 04/01/2025 (Approximate)   SpO2 98%   BMI 22.39 kg/m²   GENERAL: well developed, well nourished,in no apparent distress  SKIN: no rashes,no suspicious lesions  HEAD: atraumatic, normocephalic  EYES: conjunctiva clear, EOM intact  EARS: TM's clear, non-injected, no bulging, retraction, or fluid bilaterally  NOSE: nostrils patent, no exudates, nasal mucosa pink and noninflamed  THROAT: oral mucosa pink, moist. Posterior pharynx not erythematous and injected. No exudates. Tonsils absent.  No trismus, hoarseness, muffled voice, stridor, or uvular deviation.    NECK: supple, non-tender  LUNGS: clear to auscultation bilaterally; no wheezes, rales, or rhonchi. Breathing is non labored.  CARDIO: RRR without murmur  EXTREMITIES: no cyanosis, clubbing or edema  LYMPH: bilateral anterior cervical lymphadenopathy. No   posterior cervical or occipital lymphadenopathy.    Recent Results (from the past 24 hours)   Strep A Assay W/Optic    Collection Time: 04/22/25  6:27 PM   Result Value Ref Range    Strep Grp A Screen Negative Negative    Control Line Present with a clear background (yes/no) Yes Yes/No    Kit Lot # [id number] Numeric    Kit Expiration Date 12/20/2025 Date         ASSESSMENT AND PLAN:   Assessment: 1.   Encounter Diagnosis   Name Primary?    Sore throat Yes     Rapid strep screen is negative   Recommend tylenol/motrin.   Can try zyrtec or claritin to help pnd.   F/u with PCP if symptoms dont improve or worsen.     Plan: Discussed that due to symptoms and negative rapid strep this is most likely viral and does not require antibiotics.   Comfort measures explained and discussed as listed in Patient Instructions  Follow up with PCP in 3-5 days if not improving, condition worsens, or fever greater than or equal to 100.4 persists for 72 hours.        The patient/parent indicates understanding of these issues and agrees to the plan.             [1]   Current Outpatient Medications   Medication Sig Dispense Refill    Meloxicam 15 MG Oral Tab Take 1 tablet (15 mg total) by mouth daily. 30 tablet 0   [2]   Past Medical History:   Acne    ADHD    Anxiety    Depression    PCOS (polycystic ovarian syndrome)   [3]   Social History  Socioeconomic History    Marital status: Single   Tobacco Use    Smoking status: Never    Smokeless tobacco: Never   Vaping Use    Vaping status: Never Used   Substance and Sexual Activity    Alcohol use: Yes     Alcohol/week: 2.0 standard drinks of alcohol     Types: 2 Glasses of wine per week     Comment: rare, social    Drug use: Not Currently     Types: Cannabis    Sexual activity: Yes     Partners: Male     Birth control/protection: Condom   Other Topics Concern    Caffeine Concern Yes    Exercise Yes    Seat Belt Yes    Special Diet No    Stress Concern No    Weight Concern Yes

## 2025-05-02 ENCOUNTER — TELEPHONE (OUTPATIENT)
Dept: OBGYN CLINIC | Facility: CLINIC | Age: 25
End: 2025-05-02

## 2025-05-02 ENCOUNTER — TELEPHONE (OUTPATIENT)
Facility: CLINIC | Age: 25
End: 2025-05-02

## 2025-05-02 DIAGNOSIS — N91.2 AMENORRHEA: Primary | ICD-10-CM

## 2025-05-02 NOTE — TELEPHONE ENCOUNTER
Patient calling to initiate prenatal care  LMP 04/01  Patient is 7-8 weeks on 05/27  Confirmation of pregnancy appointment scheduled on 25 year old    Insurance becky [employer]    Any history of ectopic pregnancy? no  Any history of miscarriage? no  Any medications that you are taking on a regular basis other than prenatal vitamins? Vyanse stopped taking medicine after found pregnant through test (if not taking prenatal vitamins, encourage patient to start taking.)  Any bleeding since the first day of last LMP and your positive pregnancy test? no

## 2025-05-02 NOTE — TELEPHONE ENCOUNTER
Patient is pregnant with first baby, lvm with info for two [facility] practices accepting patients for OB care

## 2025-05-10 ENCOUNTER — OFFICE VISIT (OUTPATIENT)
Dept: FAMILY MEDICINE CLINIC | Facility: CLINIC | Age: 25
End: 2025-05-10
Payer: COMMERCIAL

## 2025-05-10 ENCOUNTER — LAB ENCOUNTER (OUTPATIENT)
Dept: LAB | Age: 25
End: 2025-05-10
Attending: EMERGENCY MEDICINE
Payer: COMMERCIAL

## 2025-05-10 VITALS
HEIGHT: 63 IN | OXYGEN SATURATION: 99 % | DIASTOLIC BLOOD PRESSURE: 58 MMHG | RESPIRATION RATE: 16 BRPM | SYSTOLIC BLOOD PRESSURE: 106 MMHG | BODY MASS INDEX: 21.44 KG/M2 | HEART RATE: 79 BPM | WEIGHT: 121 LBS

## 2025-05-10 DIAGNOSIS — Z00.00 LABORATORY EXAMINATION ORDERED AS PART OF A ROUTINE GENERAL MEDICAL EXAMINATION: ICD-10-CM

## 2025-05-10 DIAGNOSIS — F90.2 ATTENTION DEFICIT HYPERACTIVITY DISORDER (ADHD), COMBINED TYPE: ICD-10-CM

## 2025-05-10 DIAGNOSIS — E61.1 IRON DEFICIENCY: ICD-10-CM

## 2025-05-10 DIAGNOSIS — Z00.00 ENCOUNTER FOR ANNUAL PHYSICAL EXAMINATION EXCLUDING GYNECOLOGICAL EXAMINATION IN A PATIENT OLDER THAN 17 YEARS: Primary | ICD-10-CM

## 2025-05-10 DIAGNOSIS — R76.8 ANTINUCLEAR ANTIBODY (ANA) POSITIVE: ICD-10-CM

## 2025-05-10 DIAGNOSIS — Z3A.01 LESS THAN 8 WEEKS GESTATION OF PREGNANCY (HCC): ICD-10-CM

## 2025-05-10 LAB
ALBUMIN SERPL-MCNC: 4.7 G/DL (ref 3.2–4.8)
ALBUMIN/GLOB SERPL: 2 {RATIO} (ref 1–2)
ALP LIVER SERPL-CCNC: 47 U/L (ref 37–98)
ALT SERPL-CCNC: 13 U/L (ref 10–49)
ANION GAP SERPL CALC-SCNC: 8 MMOL/L (ref 0–18)
AST SERPL-CCNC: 18 U/L (ref ?–34)
BASOPHILS # BLD AUTO: 0.01 X10(3) UL (ref 0–0.2)
BASOPHILS NFR BLD AUTO: 0.1 %
BILIRUB SERPL-MCNC: 0.7 MG/DL (ref 0.3–1.2)
BUN BLD-MCNC: 8 MG/DL (ref 9–23)
CALCIUM BLD-MCNC: 9.7 MG/DL (ref 8.7–10.6)
CHLORIDE SERPL-SCNC: 107 MMOL/L (ref 98–112)
CHOLEST SERPL-MCNC: 132 MG/DL (ref ?–200)
CO2 SERPL-SCNC: 25 MMOL/L (ref 21–32)
CREAT BLD-MCNC: 0.62 MG/DL (ref 0.55–1.02)
DEPRECATED HBV CORE AB SER IA-ACNC: 115 NG/ML (ref 50–306)
EGFRCR SERPLBLD CKD-EPI 2021: 127 ML/MIN/1.73M2 (ref 60–?)
EOSINOPHIL # BLD AUTO: 0.1 X10(3) UL (ref 0–0.7)
EOSINOPHIL NFR BLD AUTO: 1.3 %
ERYTHROCYTE [DISTWIDTH] IN BLOOD BY AUTOMATED COUNT: 11.6 %
FASTING PATIENT LIPID ANSWER: YES
FASTING STATUS PATIENT QL REPORTED: YES
GLOBULIN PLAS-MCNC: 2.4 G/DL (ref 2–3.5)
GLUCOSE BLD-MCNC: 84 MG/DL (ref 70–99)
HCT VFR BLD AUTO: 42.2 % (ref 35–48)
HDLC SERPL-MCNC: 59 MG/DL (ref 40–59)
HGB BLD-MCNC: 14.6 G/DL (ref 12–16)
IMM GRANULOCYTES # BLD AUTO: 0.02 X10(3) UL (ref 0–1)
IMM GRANULOCYTES NFR BLD: 0.3 %
LDLC SERPL CALC-MCNC: 64 MG/DL (ref ?–100)
LYMPHOCYTES # BLD AUTO: 1.95 X10(3) UL (ref 1–4)
LYMPHOCYTES NFR BLD AUTO: 25.8 %
MCH RBC QN AUTO: 31.2 PG (ref 26–34)
MCHC RBC AUTO-ENTMCNC: 34.6 G/DL (ref 31–37)
MCV RBC AUTO: 90.2 FL (ref 80–100)
MONOCYTES # BLD AUTO: 0.5 X10(3) UL (ref 0.1–1)
MONOCYTES NFR BLD AUTO: 6.6 %
NEUTROPHILS # BLD AUTO: 4.98 X10 (3) UL (ref 1.5–7.7)
NEUTROPHILS # BLD AUTO: 4.98 X10(3) UL (ref 1.5–7.7)
NEUTROPHILS NFR BLD AUTO: 65.9 %
NONHDLC SERPL-MCNC: 73 MG/DL (ref ?–130)
OSMOLALITY SERPL CALC.SUM OF ELEC: 288 MOSM/KG (ref 275–295)
PLATELET # BLD AUTO: 351 10(3)UL (ref 150–450)
POTASSIUM SERPL-SCNC: 4.7 MMOL/L (ref 3.5–5.1)
PROT SERPL-MCNC: 7.1 G/DL (ref 5.7–8.2)
RBC # BLD AUTO: 4.68 X10(6)UL (ref 3.8–5.3)
SODIUM SERPL-SCNC: 140 MMOL/L (ref 136–145)
TRIGL SERPL-MCNC: 35 MG/DL (ref 30–149)
TSI SER-ACNC: 1.23 UIU/ML (ref 0.55–4.78)
VLDLC SERPL CALC-MCNC: 5 MG/DL (ref 0–30)
WBC # BLD AUTO: 7.6 X10(3) UL (ref 4–11)

## 2025-05-10 PROCEDURE — 36415 COLL VENOUS BLD VENIPUNCTURE: CPT

## 2025-05-10 PROCEDURE — 82728 ASSAY OF FERRITIN: CPT

## 2025-05-10 PROCEDURE — 85025 COMPLETE CBC W/AUTO DIFF WBC: CPT

## 2025-05-10 PROCEDURE — 99395 PREV VISIT EST AGE 18-39: CPT | Performed by: EMERGENCY MEDICINE

## 2025-05-10 PROCEDURE — 80061 LIPID PANEL: CPT

## 2025-05-10 PROCEDURE — 80053 COMPREHEN METABOLIC PANEL: CPT

## 2025-05-10 PROCEDURE — 84443 ASSAY THYROID STIM HORMONE: CPT

## 2025-05-10 RX ORDER — MULTIVIT,IRON,MINERALS/LUTEIN
1 TABLET ORAL 3 TIMES DAILY
COMMUNITY

## 2025-05-10 NOTE — PROGRESS NOTES
The following individual(s) verbally consented to be recorded using ambient AI listening technology and understand that they can each withdraw their consent to this listening technology at any point by asking the clinician to turn off or pause the recording:    Patient name: Lulú Pulliam  Additional names:

## 2025-05-10 NOTE — PATIENT INSTRUCTIONS
Thank you for choosing [facility]  To Do:  FOR ANSHU ROBERTSON    Follow up yearly  Have blood tests paco  Follow up with OB as scheduled  Pap per OB  Follow up after pregnancy when ready to restart Vyvanse        Well balanced diet recommended.    Routine exercise recommended most days during the week.  Wear sunscreen - SPF 15 or higher and reapply every 2 hours as needed.  Wear seat belts and drive safely.  Schedule regular appointments with dentist.  Schedule yearly eye exam if you wear glasses/contacts.  Yearly Flu Vaccine recommended.  Tetanus, Diptheria and Pertussis vaccine should be given every 7-10 years.  Call or come in if there are concerns regarding domestic abuse, sexually transmitted diseases, alcohol/drug addiction, depression/anxiety issues, or any further concerns.          Prevention Guidelines, Women Ages 18 to 39  Screening tests and vaccines are an important part of managing your health. Health counseling is essential, too. Below are guidelines for these, for women ages 18 to 39. Talk with your healthcare provider to make sure you’re up-to-date on what you need.  Screening Who needs it How often   Alcohol misuse All women in this age group At routine exams   Blood pressure All women in this age group Every 2 years if your blood pressure is less than 120/80 mm Hg; yearly if your systolic blood pressure is 120 to 139 mm Hg, or your diastolic blood pressure reading is 80 to 89 mm Hg   Breast cancer All women in this age group should talk with their healthcare providers about the need for clinical breast exams (CBE)1 Clinical breast exam every 3 years1   Cervical cancer Women ages 21 and older Women between ages 21 and 29 should have a Pap test every 3 years; women between ages 30 and 65 are advised to have a Pap test plus an HPV test every 5 years   Chlamydia Sexually active women ages 24 and younger, and women at increased risk for infection Every 3 years if you're at risk or have  symptoms   Depression All women in this age group At routine exams   Diabetes mellitus, type 2 Adults with no symptoms who are overweight or obese and have 1 or more other risk factors for diabetes At least every 3 years   Gonorrhea Sexually active women at increased risk for infection At routine exams   Hepatitis C Anyone at increased risk At routine exams   HIV All women At routine exams   Obesity All women in this age group At routine exams   Syphilis Women at increased risk for infection - talk with your healthcare provider At routine exams   Tuberculosis Women at increased risk for infection - talk with your healthcare provider Ask your healthcare provider   Vision All women in this age group At least 1 complete exam in your 20s, and 2 in your 30s   Vaccine2 Who needs it How often   Chickenpox (varicella) All women in this age group who have no record of this infection or vaccine 2 doses; the second dose should be given 4 to 8 weeks after the first dose   Hepatitis A Women at increased risk for infection - talk with your healthcare provider 2 doses given at least 6 months apart   Hepatitis B Women at increased risk for infection - talk with your healthcare provider 3 doses over 6 months; second dose should be given 1 month after the first dose; the third dose should be given at least 2 months after the second dose and at least 4 months after the first dose   Haemophilus influenzae Type B (HIB) Women at increased risk for infection - talk with your healthcare provider 1 to 3 doses   Human papillomavirus (HPV) All women in this age group up to age 26 3 doses; the second dose should be given 1 to 2 months after the first dose and the third dose given 6 months after the first dose   Influenza (flu) All women in this age group Once a year   Measles, mumps, rubella (MMR) All women in this age group who have no record of these infections or vaccines 1 or 2 doses   Meningococcal Women at increased risk for infection -  talk with your healthcare provider 1 or more doses   Pneumococcal conjugate vaccine (PCV13) and pneumococcal polysaccharide vaccine (PPSV23) Women at increased risk for infection - talk with your healthcare provider PCV13: 1 dose ages 19 to 65 (protects against 13 types of pneumococcal bacteria)  PPSV23: 1 to 2 doses through age 64, or 1 dose at 65 or older (protects against 23 types of pneumococcal bacteria)      Tetanus/diphtheria/pertussis (Td/Tdap) booster All women in this age group Td every 10 years, or a one-time dose of Tdap instead of a Td booster after age 18, then Td every 10 years   Counseling Who needs it How often   BRCA gene mutation testing for breast and ovarian cancer susceptibility Women with increased risk for having gene mutation When your risk is known   Breast cancer and chemoprevention Women at high risk for breast cancer When your risk is known   Diet and exercise Women who are overweight or obese When diagnosed, and then at routine exams   Domestic violence Women at the age in which they are able to have children At routine exams   Sexually transmitted infection prevention Women who are sexually active At routine exams   Skin cancer Prevention of skin cancer in fair-skinned adults through age 24 At routine exams   Use of tobacco and the health effects it can cause All women in this age group Every visit   1According to the ACS, women ages 20 to 39 years should have a clinical breast exam (CBE) as part of their routine health exam every 3 years, and breast self-exams are an option for women starting in their 20s. However, the  USPSTF does not recommend CBE.  2Those who are 18 years of age, who are not up-to-date on their childhood immunizations, should receive all appropriate catch-up vaccines recommended by the CDC.  Date Last Reviewed: 8/27/2015  © 6394-3380 [employer]. [address] [place] [address], [place] [address]. All rights reserved. This information is not intended as a  substitute for professional medical care. Always follow your healthcare professional's instructions.

## 2025-05-10 NOTE — PROGRESS NOTES
Lulú Pulliam is a 25 year old female who presents for a complete physical exam.   HPI:     Chief Complaint   Patient presents with    Well Adult     Recently found out she is pregnant, will complete her pap at [facility] apt.     ADHD     Pt discontinued Vyvanse due to her pregnancy           Age: 25    1First day of last menstrual period (or first year of         menstruation, if through menopause APril 2   2Number of times pregnant: 1 NOW, preg test positive 1 week ago              Number of completed pregnancies:  0              Date of last pregnancy:  0   3. If you are under age 55, what method of birth control do you use? No              Are you planning a pregnancy  in the next 6-12 months?    4. Have you had any of the following problems:               A.  Abnormal Pap smears  Never had               If yes, date:                 problem:              B. High blood pressure, heart disease or high cholesterol  NO                  D.Abdominal or pelvic surgery or special tests  NO   5. Do you have any of the following:      Problems with present method of birth control NA   Bleeding between periods or since periods stopped      A new or enlarging lump in breast NO      Change in size/firmness of stools  NO   Change in size/color of a mole     6. Do you have a parent, brother or sister with a history of the following:                  Cancer of the breast, intestine or female organs No                Heart pain or heart attacks  before the age of 55 NO   8. Have you ever used tobacco?     NO     9. Do you drink alcohol?     NONE    10. Prevention:         b. Exercise:                              Activity:  YES       c. Do you always wear seat belts?          YES       d. If over 30 years old, have you  had your cholesterol level checked  in the past five years? YES       e. Have you had a tetanus shot  the past 10 years? YES 2021       f. Does your house have a working smoke detector? YES        i.  How  many sexual partners have you  had in the last 12 months? 1    j. When is the last time you had           a dental check-up?  recent                  History of Present Illness  Lulú Pulliam is a 25 year old female who presents with a positive pregnancy test and concerns about her condition.    She has a positive pregnancy test after missing her period, which was last on April 2nd, indicating she is approximately 5 to 6 weeks pregnant. She experiences mild breast tenderness and occasional sharp pain on the left side. She has been taking prenatal vitamins and was previously on folic acid as a preconception multivitamin. An OB appointment is scheduled for May 27th.    She has a history of polycystic ovary syndrome (PCOS) managed with diet and exercise. Her diet is low in carbohydrates, including plant-based pasta and sourdough bread in moderation. She is aware of her glucose intolerance and maintains a healthy weight with a BMI of 21. Due to her pregnancy, she has stopped taking Vyvanse for ADHD.    She has a positive antinuclear antibody (LAURA) test from the previous year, with a rheumatologist indicating it might be an early sign of an autoimmune condition. She experiences joint pain and hair loss, potentially related to low ferritin levels. Her ferritin was noted to be low last year, and she is now taking prenatal vitamins which may help improve this.    She has not had a Pap smear since 2022 and has a family history of lupus on her father's side. She does not consume alcohol or smoke. Her last tetanus shot was in 2021, and she had a dental checkup four months ago. 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 and her  works as a private contractor.            Wt Readings from Last 3 Encounters:   05/10/25 121 lb (54.9 kg)   04/22/25 126 lb 6.4 oz (57.3 kg)   06/13/24 125 lb (56.7 kg)        BP Readings from Last 3 Encounters:   05/10/25 106/58   04/22/25 110/72   01/29/24 116/74          Patient's last menstrual period was 04/01/2025 (exact date).       Current Medications[1]   Past Medical History[2]   Past Surgical History[3]   Family History[4]   Short Social Hx on File[5]     Current Medications[6]   Past Medical History[7]   Past Surgical History[8]   Family History[9]   Social History:   Short Social Hx on File[10]         REVIEW OF SYSTEMS:   GENERAL HEALTH: feels well, no fatigue.  SKIN: denies any unusual skin lesions or rashes  EYES: no visual complaints or deficits  HEENT: denies nasal congestion, sinus pain or sore throat; hearing loss negative,   RESPIRATORY: denies shortness of breath, wheezing or cough   CARDIOVASCULAR: denies chest pain, SOB, edema,orthopnea, no palpitations   GI: denies nausea, vomiting, constipation, diarrhea; no rectal bleeding; no heartburn  GENITAL/: no dysuria, urgency or frequency  MUSCULOSKELETAL: no joint complaints upper or lower extremities  NEURO: no sensory or motor complaint  HEMATOLOGY: denies hx anemia; denies bruising or excessive bleeding  ENDOCRINE: denies excessive thirst or urination; denies unexpected wt gain or wt loss  ALLERGY/IMM.: denies food or seasonal allergies  PSYCH: no symptoms of depression or anxiety, depression screening negative.      EXAM:   /58   Pulse 79   Resp 16   Ht 5' 3\" (1.6 m)   Wt 121 lb (54.9 kg)   LMP 04/01/2025 (Exact Date)   SpO2 99%   BMI 21.43 kg/m²      General: WD/WN in no acute distress.   HEENT: PERRLA and EOMI.  OP moist no lesions.TM WNL, remington.Normal ears canals bilaterally.  Neck is supple, with no cervical LAD or thyroid abnormalities. No carotid bruits.    Lungs: are clear to auscultation bilaterally, with no wheeze, rhonchi, or rales.   Heart: is RRR.  S1, S2, with no murmurs,clicks, gallops  Abdomen: is soft,NBS, NT/ND with no HSM.  No rebound or guarding. No CVA tenderness, no hernias.   exam: deferred  Neuro: Cranial nerves II-XII normal,no focal abnormalities, and reflexes  coordination and gait normal and symmetric.Sensation intact.  Extremities: are symmetric with no cyanosis, clubbing, or edema.  MS: Normal muscles tones, no joints abnormalities.  SKIN: Normal color, turgor, no lesions, rashes or wounds.  PSYCH: normal affect and mood.      ASSESSMENT AND PLAN:           1. Encounter for annual physical examination excluding gynecological examination in a patient older than 17 years    2. Laboratory examination ordered as part of a routine general medical examination  - CBC With Differential With Platelet; Future  - Comp Metabolic Panel (14); Future  - Lipid Panel; Future  - TSH W Reflex To Free T4; Future    3. Iron deficiency  - Ferritin [E]; Future    4. Antinuclear antibody (LAURA) positive    5. Attention deficit hyperactivity disorder (ADHD), combined type    6. Less than 8 weeks gestation of pregnancy (HCC)      Assessment & Plan  Pregnancy  Confirmed pregnancy at 5-6 weeks. Discussed prenatal vitamins, gestational diabetes risk due to PCOS, and weight management.  - Continue prenatal vitamins.  - Follow up with OB as scheduled.  - Monitor for gestational diabetes.  - Maintain healthy weight and diet.    Polycystic Ovary Syndrome (PCOS)  PCOS managed with lifestyle modifications. Discussed glucose intolerance and gestational diabetes risk.  - Maintain healthy weight.  - Monitor carbohydrate intake, focusing on complex carbohydrates.  - Engage in regular exercise, emphasizing weight training.      Attention Deficit Hyperactivity Disorder (ADHD)  ADHD previously managed with Vyvanse, discontinued due to pregnancy. Discussed symptom management during pregnancy and postpartum medication considerations.  - Consider resuming Vyvanse postpartum if not breastfeeding.  - Monitor ADHD symptoms during pregnancy.    Positive LAURA  Positive LAURA with no current autoimmune symptoms. Discussed monitoring and potential future diagnosis.  - Monitor for symptoms suggestive of autoimmune  disease.  - Consider re-evaluation of LAURA if symptoms develop.    Low Ferritin  Low ferritin likely dietary. Discussed dietary adjustments to increase iron intake.  Continue prenatal vitamins for now  - Monitor ferritin levels.  - Consider dietary adjustments to increase iron intake, including consumption of lean red meats and cooking with iron skillet.    Wellness Visit  Routine wellness visit. Reviewed pregnancy and health considerations, emphasizing diet and exercise.  - Follow up with OB as scheduled.  - Continue prenatal vitamins.  - Discuss prenatal vitamin with OB.  - Schedule Pap smear as due in 2025.        Lulú Pulliam is a 25 year old female who presents for a complete physical exam.Gyn exam and Pap smear to be done thru OB  Self breast exams advised.  The patient should schedule annual mammograms beginning at the age of 40.    Counseled on fat diet and aerobic exercise 30 minutes three times weekly.   Counseled on maintaining a healthy weight and healthy BMI  Health maintenance.   Immunizations reviewed and updated  TDAP UTD  The patient indicates understanding of these issues and agrees to the plan.  The patient is asked  to return yearly for annual preventative health exam.    Well balanced diet recommended.    Routine exercise recommended most days during the week.  Wear sunscreen - SPF 15 or higher and reapply every 2 hours as needed.  Wear seat belts and drive safely.  Schedule regular appointments with dentist.  Schedule yearly eye exam if you wear glasses/contacts.  Yearly Flu Vaccine recommended.  Tetanus, Diptheria and Pertussis vaccine should be given every 7-10 years.  Call or come in if there are concerns regarding domestic abuse, sexually transmitted diseases, alcohol/drug addiction, depression/anxiety issues, or any further concerns.    PATIENT INSTRUCTIONS:    Follow up yearly  Have blood tests paco  Follow up with OB as scheduled  Pap per OB  Follow up after pregnancy when ready to  restart Vyvanse        FOLLOW UP:  After pregnancy         [1]   Current Outpatient Medications   Medication Sig Dispense Refill    Omega-3 Fatty Acids (MEGARED ADVANCED OMEGA-3 OR) Take 2 capsules by mouth in the morning.      NON FORMULARY Take 4 capsules by mouth in the morning. DEBBY & D-CHIRO INOSITOL- for her PCOS .      Prenatal Vit-Fe Fumarate-FA (MULTI PRENATAL) 27-0.8 MG Oral Tab Take 1 capsule by mouth in the morning, at noon, and at bedtime.     [2]   Past Medical History:   Acne    ADHD    Anxiety    Depression    PCOS (polycystic ovarian syndrome)   [3]   Past Surgical History:  Procedure Laterality Date    Adenoidectomy      Removal of tonsils,12+ y/o      Tonsillectomy     [4]   Family History  Problem Relation Age of Onset    ADHD Mother     Cancer Maternal Grandfather         Bone unknown Primary    Diabetes Maternal Grandfather     Colon Cancer Other         MGGF, MGGM    Diabetes Maternal Grandmother     Anxiety Brother     Depression Brother     Anxiety Brother     Depression Brother    [5]   Social History  Socioeconomic History    Marital status: Single   Tobacco Use    Smoking status: Never    Smokeless tobacco: Never   Vaping Use    Vaping status: Never Used   Substance and Sexual Activity    Alcohol use: Yes     Alcohol/week: 2.0 standard drinks of alcohol     Types: 2 Glasses of wine per week     Comment: rare, social    Drug use: Not Currently     Types: Cannabis    Sexual activity: Yes     Partners: Male     Birth control/protection: Condom   Other Topics Concern    Caffeine Concern Yes    Exercise Yes    Seat Belt Yes    Special Diet No    Stress Concern No    Weight Concern Yes     Social Drivers of Health     Food Insecurity: No Food Insecurity (5/10/2025)    NCSS - Food Insecurity     Worried About Running Out of Food in the Last Year: No     Ran Out of Food in the Last Year: No   Transportation Needs: No Transportation Needs (5/10/2025)    NCSS - Transportation     Lack of  Transportation: No   Housing Stability: Not At Risk (5/10/2025)    NCSS - Housing/Utilities     Has Housing: Yes     Worried About Losing Housing: No     Unable to Get Utilities: No   [6]   Current Outpatient Medications   Medication Sig Dispense Refill    Omega-3 Fatty Acids (MEGARED ADVANCED OMEGA-3 OR) Take 2 capsules by mouth in the morning.      NON FORMULARY Take 4 capsules by mouth in the morning. DEBBY & D-CHIRO INOSITOL- for her PCOS .      Prenatal Vit-Fe Fumarate-FA (MULTI PRENATAL) 27-0.8 MG Oral Tab Take 1 capsule by mouth in the morning, at noon, and at bedtime.     [7]   Past Medical History:   Acne    ADHD    Anxiety    Depression    PCOS (polycystic ovarian syndrome)   [8]   Past Surgical History:  Procedure Laterality Date    Adenoidectomy      Removal of tonsils,12+ y/o      Tonsillectomy     [9]   Family History  Problem Relation Age of Onset    ADHD Mother     Cancer Maternal Grandfather         Bone unknown Primary    Diabetes Maternal Grandfather     Colon Cancer Other         MGGF, MGGM    Diabetes Maternal Grandmother     Anxiety Brother     Depression Brother     Anxiety Brother     Depression Brother    [10]   Social History  Socioeconomic History    Marital status: Single   Tobacco Use    Smoking status: Never    Smokeless tobacco: Never   Vaping Use    Vaping status: Never Used   Substance and Sexual Activity    Alcohol use: Yes     Alcohol/week: 2.0 standard drinks of alcohol     Types: 2 Glasses of wine per week     Comment: rare, social    Drug use: Not Currently     Types: Cannabis    Sexual activity: Yes     Partners: Male     Birth control/protection: Condom   Other Topics Concern    Caffeine Concern Yes    Exercise Yes    Seat Belt Yes    Special Diet No    Stress Concern No    Weight Concern Yes     Social Drivers of Health     Food Insecurity: No Food Insecurity (5/10/2025)    NCSS - Food Insecurity     Worried About Running Out of Food in the Last Year: No     Ran Out of Food in  the Last Year: No   Transportation Needs: No Transportation Needs (5/10/2025)    NCSS - Transportation     Lack of Transportation: No   Housing Stability: Not At Risk (5/10/2025)    NCSS - Housing/Utilities     Has Housing: Yes     Worried About Losing Housing: No     Unable to Get Utilities: No

## 2025-05-12 ENCOUNTER — HOSPITAL ENCOUNTER (EMERGENCY)
Age: 25
Discharge: HOME | End: 2025-05-12
Attending: EMERGENCY MEDICINE
Payer: COMMERCIAL

## 2025-05-12 ENCOUNTER — APPOINTMENT (OUTPATIENT)
Dept: ULTRASOUND IMAGING | Age: 25
End: 2025-05-12
Payer: COMMERCIAL

## 2025-05-12 VITALS
DIASTOLIC BLOOD PRESSURE: 82 MMHG | HEIGHT: 63 IN | RESPIRATION RATE: 14 BRPM | OXYGEN SATURATION: 100 % | HEART RATE: 84 BPM | SYSTOLIC BLOOD PRESSURE: 121 MMHG | WEIGHT: 122 LBS | BODY MASS INDEX: 21.62 KG/M2 | TEMPERATURE: 99 F

## 2025-05-12 DIAGNOSIS — Z3A.01 LESS THAN 8 WEEKS GESTATION OF PREGNANCY (HCC): Primary | ICD-10-CM

## 2025-05-12 DIAGNOSIS — O20.0 THREATENED MISCARRIAGE (HCC): ICD-10-CM

## 2025-05-12 LAB
B-HCG SERPL-ACNC: ABNORMAL MIU/ML (ref ?–4.2)
BASOPHILS # BLD AUTO: 0.02 X10(3) UL (ref 0–0.2)
BASOPHILS NFR BLD AUTO: 0.2 %
EOSINOPHIL # BLD AUTO: 0.16 X10(3) UL (ref 0–0.7)
EOSINOPHIL NFR BLD AUTO: 1.7 %
ERYTHROCYTE [DISTWIDTH] IN BLOOD BY AUTOMATED COUNT: 11.4 %
HCT VFR BLD AUTO: 39.1 % (ref 35–48)
HGB BLD-MCNC: 14.3 G/DL (ref 12–16)
IMM GRANULOCYTES # BLD AUTO: 0.03 X10(3) UL (ref 0–1)
IMM GRANULOCYTES NFR BLD: 0.3 %
LYMPHOCYTES # BLD AUTO: 2.56 X10(3) UL (ref 1–4)
LYMPHOCYTES NFR BLD AUTO: 26.8 %
MCH RBC QN AUTO: 32 PG (ref 26–34)
MCHC RBC AUTO-ENTMCNC: 36.6 G/DL (ref 31–37)
MCV RBC AUTO: 87.5 FL (ref 80–100)
MONOCYTES # BLD AUTO: 0.49 X10(3) UL (ref 0.1–1)
MONOCYTES NFR BLD AUTO: 5.1 %
NEUTROPHILS # BLD AUTO: 6.31 X10 (3) UL (ref 1.5–7.7)
NEUTROPHILS # BLD AUTO: 6.31 X10(3) UL (ref 1.5–7.7)
NEUTROPHILS NFR BLD AUTO: 65.9 %
PLATELET # BLD AUTO: 321 10(3)UL (ref 150–450)
RBC # BLD AUTO: 4.47 X10(6)UL (ref 3.8–5.3)
RH BLOOD TYPE: POSITIVE
WBC # BLD AUTO: 9.6 X10(3) UL (ref 4–11)

## 2025-05-12 PROCEDURE — 99284 EMERGENCY DEPT VISIT MOD MDM: CPT

## 2025-05-12 PROCEDURE — 99285 EMERGENCY DEPT VISIT HI MDM: CPT

## 2025-05-12 PROCEDURE — 86901 BLOOD TYPING SEROLOGIC RH(D): CPT | Performed by: EMERGENCY MEDICINE

## 2025-05-12 PROCEDURE — 76817 TRANSVAGINAL US OBSTETRIC: CPT | Performed by: EMERGENCY MEDICINE

## 2025-05-12 PROCEDURE — 86900 BLOOD TYPING SEROLOGIC ABO: CPT | Performed by: EMERGENCY MEDICINE

## 2025-05-12 PROCEDURE — 76801 OB US < 14 WKS SINGLE FETUS: CPT | Performed by: EMERGENCY MEDICINE

## 2025-05-12 PROCEDURE — 85025 COMPLETE CBC W/AUTO DIFF WBC: CPT | Performed by: EMERGENCY MEDICINE

## 2025-05-12 PROCEDURE — 36415 COLL VENOUS BLD VENIPUNCTURE: CPT

## 2025-05-12 PROCEDURE — 84702 CHORIONIC GONADOTROPIN TEST: CPT

## 2025-05-12 PROCEDURE — 86900 BLOOD TYPING SEROLOGIC ABO: CPT

## 2025-05-12 PROCEDURE — 84702 CHORIONIC GONADOTROPIN TEST: CPT | Performed by: EMERGENCY MEDICINE

## 2025-05-12 PROCEDURE — 86901 BLOOD TYPING SEROLOGIC RH(D): CPT

## 2025-05-12 PROCEDURE — 85025 COMPLETE CBC W/AUTO DIFF WBC: CPT

## 2025-05-12 NOTE — ED INITIAL ASSESSMENT (HPI)
Reports she is about 6 weeks preg with pinkish/red blood when she wipes that started today.  Today with right lower pelvic pain.

## 2025-05-13 ENCOUNTER — TELEPHONE (OUTPATIENT)
Dept: OBGYN CLINIC | Facility: CLINIC | Age: 25
End: 2025-05-13

## 2025-05-13 PROBLEM — O20.0 THREATENED MISCARRIAGE (HCC): Status: ACTIVE | Noted: 2025-05-13

## 2025-05-13 NOTE — ED PROVIDER NOTES
Patient Seen in: [facility] [place]      History     Chief Complaint   Patient presents with    Pregnancy Issues     Stated Complaint: 6 weeks pregnant right side abdominal pain cramping and bleeding.    Subjective:   HPI  History of Present Illness            25-year-old female G1, P0 6 weeks pregnant presents to ED with pelvic cramping and pinkish-red blood when she wiped today.  Denies fevers, chills, urinary symptoms.      Objective:     Past Medical History:    Acne    ADHD    Anxiety    Depression    PCOS (polycystic ovarian syndrome)              Past Surgical History:   Procedure Laterality Date    Adenoidectomy      Removal of tonsils,12+ y/o      Tonsillectomy                  Social History     Socioeconomic History    Marital status: Single   Tobacco Use    Smoking status: Former     Types: Cigarettes    Smokeless tobacco: Never   Vaping Use    Vaping status: Never Used   Substance and Sexual Activity    Alcohol use: Yes     Alcohol/week: 2.0 standard drinks of alcohol     Types: 2 Glasses of wine per week     Comment: rare, social    Drug use: Not Currently     Types: Cannabis    Sexual activity: Yes     Partners: Male     Birth control/protection: Condom   Other Topics Concern    Caffeine Concern Yes    Exercise Yes    Seat Belt Yes    Special Diet No    Stress Concern No    Weight Concern Yes     Social Drivers of Health     Food Insecurity: No Food Insecurity (5/10/2025)    NCSS - Food Insecurity     Worried About Running Out of Food in the Last Year: No     Ran Out of Food in the Last Year: No   Transportation Needs: No Transportation Needs (5/10/2025)    NCSS - Transportation     Lack of Transportation: No   Housing Stability: Not At Risk (5/10/2025)    NCSS - Housing/Utilities     Has Housing: Yes     Worried About Losing Housing: No     Unable to Get Utilities: No                                Physical Exam     ED Triage Vitals [05/12/25 1750]   /66   Pulse 80    Resp 16   Temp 98.5 °F (36.9 °C)   Temp src Temporal   SpO2 97 %   O2 Device None (Room air)       Current Vitals:   No data recorded        Physical Exam  Abdomen: Soft nontender nondistended        ED Course     Labs Reviewed   HCG, BETA SUBUNIT (QUANT PREGNANCY TEST) - Abnormal; Notable for the following components:       Result Value    Hcg Quantitative 15,946.2 (*)     All other components within normal limits   CBC WITH DIFFERENTIAL WITH PLATELET   ABORH (BLOOD TYPE)          Results            US PREG 1ST TRIM W/EV (CPT=76801/01690)  Addendum Date: 5/12/2025  ADDENDUM:  There is a typographical error in the impression.  1. A fetal pole along with a gestational sac are identified.  Dictated by (CST): Shaun Borges MD on 5/12/2025 at 10:34 PM     Finalized by (CST): Shaun Borges MD on 5/12/2025 at 10:35 PM             Result Date: 5/12/2025  PROCEDURE:  US OB W/ EV 1ST TRIMESTER (CPT=76801/05248)  COMPARISON:  None.  INDICATIONS:  6 weeks pregnant right side abdominal pain cramping and bleeding.  TECHNIQUE:  Transabdominal and endovaginal pelvic ultrasound examinations were performed.  PATIENT STATED HISTORY: (As transcribed by Technologist)  Patient is about 6 weeks pregnant and started having abdominal cramping and vaginal bleeding today.    MEASUREMENTS:  Crown Rump Length:       0.28 cm Left Ovary:                       3.93 cm x 2.49 cm x 3.77 cm Right Ovary:                     2.16 cm x 2.72 cm x 1.55 cm    FINDINGS:  GESTATIONAL SAC:  Present and normal appearing. FETAL POLE:  Present and normal appearing. YOLK SAC:  Present. CARDIAC ACTIVITY:  Not detected UTERUS:  Subchorionic hemorrhage measures 8 x 5 x 11 mm. PLACENTA:  Not identified.  RIGHT OVARY:  Normal. LEFT OVARY:  Corpus luteal cyst measures 2.4 x 1.8 x 2.2 cm.  Simple cyst measures 1.9 x 1.2 x 1.4 cm. CUL-DE-SAC:  Normal. CLINICAL AGE:  5 weeks 6 days SONOGRAPHIC AGE:  5 weeks 6 days +/-4 days.  Ultrasound estimated date of  delivery is 1/6/2026. OTHER:  Negative.            CONCLUSION:   1. A FETAL POLE ALONG WITH THE 0 SAC ARE IDENTIFIED.  HOWEVER, A FETAL HEART RATE IS NOT DETECTED.  THIS IS MORE THAN LIKELY DUE TO THE ULTRASOUND BEING TOO EARLY TO DETECT.  POSSIBILITY OF EMBRYONIC DEMISE CANNOT BE EXCLUDED.  SHORT-TERM FOLLOW-UP WITH BETA HCG AND PELVIC ULTRASOUND AS NEEDED IS RECOMMENDED.    LOCATION:  [facility]   Dictated by (CST): Shaun Borges MD on 5/12/2025 at 9:23 PM     Finalized by (CST): Shaun Borges MD on 5/12/2025 at 9:25 PM                         MDM      Medical Decision Making:    Differential diagnosis before testing includes threatened miscarriage, ectopic potential life threatening diagnosis which is a medical condition that poses a threat to life/function.       Discussions of management with: Discussed with OB on-call, agree with plan for close follow-up      I personally reviewed the ultrasound and my independent interpretation includes fetal pole and gestational sac    Shared decision making:    A fetal pole along with a gestational sac identified, quant 15K.  Likely early in pregnancy, discussed too soon to verify, that she needs 48-hour quant and repeat ultrasound.  Discussed with OB on-call, they will get her in for close follow-up.    Medical Decision Making      Disposition and Plan     Clinical Impression:  1. Less than 8 weeks gestation of pregnancy (HCC)    2. Threatened miscarriage (HCC)         Disposition:  Discharge  5/12/2025 10:20 pm    Follow-up:  Gale Santizo DO  [address] [facility] [address] [phone]    Follow up in 2 day(s)      Gale Santizo DO  [address] [id number] [phone]                Medications Prescribed:  Discharge Medication List as of 5/12/2025 10:23 PM                Supplementary Documentation:

## 2025-05-13 NOTE — TELEPHONE ENCOUNTER
Called and spoke with pt.  Reviewed recommendations from Dr. Lua, pt. Said she may do the repeat HCG testing just for her own well being and make herself feel better.  Reviewed the importance of pelvic rest and with reviewing ER precautions for bleeding/cramping and pelvic pain..  Pt. to monitor her spotting/bleeding and call office with any questions or concerns.  Pt. verbalizes understanding of all information and agrees to plan.

## 2025-05-13 NOTE — TELEPHONE ENCOUNTER
Patient states that she was seen in ER yesterday and needs an ER follow up.   Please call to advise

## 2025-05-13 NOTE — TELEPHONE ENCOUNTER
Pt. Needs ER F/U appointment.    Pt. was in ER yesterday for pinkish/red spotting  when wiping along with right lower pelvic pain.   US Pregnancy 1st done..please review. Along with HCG level done.  6 weeks gestation,     PATIENT IS NOT ESTABLISHED IN OUR PRACTICE    Pt' has US/ appointment. With  scheduled for 25.  Appointment. Is 2 weeks away, do you want her to monitor for now until her  appointment. Or do you want pt. to come in and have ER F/U appointment. And become established in our practice.    Called and spoke with pt. And is concerned about US results, explained that that is very normal reading for a very early US..  Pt. Is also still having some light pink spotting only when wiping along with contant mild cramping.  Pt. also has repeat HCG order for tomorrow..    Please advise.  Thanks

## 2025-05-17 ENCOUNTER — LAB ENCOUNTER (OUTPATIENT)
Dept: LAB | Age: 25
End: 2025-05-17
Attending: EMERGENCY MEDICINE
Payer: COMMERCIAL

## 2025-05-17 DIAGNOSIS — Z3A.01 LESS THAN 8 WEEKS GESTATION OF PREGNANCY (HCC): ICD-10-CM

## 2025-05-17 LAB — B-HCG SERPL-ACNC: ABNORMAL MIU/ML (ref ?–4.2)

## 2025-05-17 PROCEDURE — 84702 CHORIONIC GONADOTROPIN TEST: CPT

## 2025-05-17 PROCEDURE — 36415 COLL VENOUS BLD VENIPUNCTURE: CPT

## 2025-05-27 ENCOUNTER — OFFICE VISIT (OUTPATIENT)
Dept: OBGYN CLINIC | Facility: CLINIC | Age: 25
End: 2025-05-27
Payer: COMMERCIAL

## 2025-05-27 ENCOUNTER — ULTRASOUND ENCOUNTER (OUTPATIENT)
Dept: OBGYN CLINIC | Facility: CLINIC | Age: 25
End: 2025-05-27
Payer: COMMERCIAL

## 2025-05-27 VITALS
HEIGHT: 63 IN | DIASTOLIC BLOOD PRESSURE: 66 MMHG | BODY MASS INDEX: 22.17 KG/M2 | SYSTOLIC BLOOD PRESSURE: 104 MMHG | HEART RATE: 77 BPM | WEIGHT: 125.13 LBS

## 2025-05-27 DIAGNOSIS — N91.1 SECONDARY AMENORRHEA: Primary | ICD-10-CM

## 2025-05-27 PROCEDURE — 76856 US EXAM PELVIC COMPLETE: CPT | Performed by: STUDENT IN AN ORGANIZED HEALTH CARE EDUCATION/TRAINING PROGRAM

## 2025-05-27 PROCEDURE — 99204 OFFICE O/P NEW MOD 45 MIN: CPT | Performed by: STUDENT IN AN ORGANIZED HEALTH CARE EDUCATION/TRAINING PROGRAM

## 2025-05-27 NOTE — PROGRESS NOTES
[facility]  Obstetrics and Gynecology    Subjective:     Lulú Pulliam is a 25 year old  female presents with c/o secondary amenorrhea and positive pregnancy test. The patient was recommended to return for further evaluation. The patient reports bloating and constipation, otherwise doing well. Did have vaginal bleeding in early pregnancy, was evaluated in ED and US at 5 wga notable for subchorionic hematoma. This is no longer seen on Dating US today. Pt reports she had sex 1 week ago and states mild pelvic cramping with light spotting, that has since subsided but wanted to know about safety of sexual intercourse in pregnancy. Patient's last menstrual period was 2025 (exact date)..     Pregnancy planned?: yes  Pregnancy desired? yes    Review of Systems:  General: no complaints per category. See HPI for additional information.   Breast: no complaints per category. See HPI for additional information.   Respiratory: no complaints per category. See HPI for additional information.   Cardiovascular: no complaints per category. See HPI for additional information.   GI: no complaints per category. See HPI for additional information.   : no complaints per category. See HPI for additional information.   Heme: no complaints per category. See HPI for additional information.     OB History:   OB History    Para Term  AB Living   1 0 0 0 0 0   SAB IAB Ectopic Multiple Live Births   0 0 0 0 0      # Outcome Date GA Lbr Vasu/2nd Weight Sex Type Anes PTL Lv   1 Current                Gyne History:  Menarche: 11 years  Period Cycle (Days): Pregnant  Period Duration (Days): na  Period Flow: na  Use of Birth Control (if yes, specify type): None  Hx Prior Abnormal Pap: No  Pap Date: 22  Pap Result Notes: wnl  Patient's last menstrual period was 2025 (exact date).      Meds:  Medications Ordered Prior to Encounter[1]    All:  Allergies[2]    PMH:  Past Medical  History[3]    PSH:  Past Surgical History[4]    Objective:     Vitals:    25 1604   BP: 104/66   Pulse: 77   Weight: 125 lb 2 oz (56.8 kg)   Height: 63\"         Body mass index is 22.16 kg/m².    General: AAO.NAD.   CVS exam: normal peripheral perfusion  Chest: non-labored breathing, no tachypnea   Abdominal exam: deferred   Pelvic exam: deferred        Imaging:  First Trimester US   GA by LMP: 8w0d   GA by US: 7w4d   FHT: 150 bpm   Impression: Single live IUP. Early viable. Gestational sac, yolk sac and fetal pole seen. Both ovaries wnl. No free fluid. Cardiac activity noted. Cervix appears closed and wnl.   TABATHA 2026 by LMP c/w 7 wk US     Reviewed and electronically signed by: Gale Santizo DO, 25, 4:54 PM        Assessment:     Lulú Pulliam is a 25 year old  female who presents for secondary amenorrhea and positive pregnancy test          Plan:     Patient Active Problem List    Diagnosis    Threatened miscarriage (HCC)     With subchorionic hemorrhage. 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     Log Date: 2012        Lumbago     Specify:   Log Date: 2012        Scoliosis    Unequal leg length (acquired)           Secondary amenorrhea  - a/w positive pregnancy test  - Ultrasound reviewed and discussed with patient, refer above  - Pt counseled on safety, diet, exercise, caffiene, tobacco, ETOH, sexual activity, ER precautions, diet, exercise, appropriate otc medication use, substance abuse   - Counseled on taking a PNV with 0.4mg of folic acid   - genetic screening testing  d/w patient and family, pt declines invasive diagnostic testing and considering first versus second trimester screening   -Carrier screening, neural tube defect screening and hemoglobinopathy screening reviewed and discussed with patient; information provided and patient considering options and recommended to request desired screening at follow-up visit  - advised to follow up to establish prenatal care   - SAB precautions provided   - d/w nausea and vomiting in pregnancy including vitamin B 6 and unisom   - COVID 19 precautions provided, recommend vaccination and booster if/when applicable   -Travel precautions provided, patient advised to not travel beyond 36 weeks as long as the pregnancy remains low risk.  Advised not to travel beyond 28 weeks for high risk pregnancy. Recommend compression socks, increase water intake and movement during traveling to prevent blood clots.    All of the findings and plan were discussed with the patient.  She notes understanding and agrees with the plan of care.  All questions were answered to the best of my ability at this time.      Total patient time was 30 minutes in evaluation, consultation, and coordination of care. This included face to face and non-face to face actions. The patient's questions and concerns that were addressed.     RTC in 4 weeks for NOB visit or sooner if needed     Gale Santizo DO  [facility]     Discussed with patient that there will not be further notification of normal or benign results other than receiving results on [employer]. A [employer] message or telephone call will be placed by the physician and/or office staff if results are abnormal.         Note to patient and family   The 21st Century Cures Act makes medical notes available to patients in the interest of transparency.  However, please be advised that this is a medical document.  It is intended as aalp-xi-czqc communication.  It is written and medical language may contain abbreviations or  verbiage that are technical and unfamiliar.  It may appear blunt or direct.  Medical documents are intended to carry relevant information, facts as evident, and the clinical opinion of the practitioner.      This note could include assistance by Dragon voice recognition. Errors in content may be related to improper recognition by the system; efforts to review and correct have been done but errors may still exist.        [1]   Current Outpatient Medications on File Prior to Visit   Medication Sig Dispense Refill    Omega-3 Fatty Acids (MEGARED ADVANCED OMEGA-3 OR) Take 2 capsules by mouth in the morning.      Prenatal Vit-Fe Fumarate-FA (MULTI PRENATAL) 27-0.8 MG Oral Tab Take 1 capsule by mouth in the morning, at noon, and at bedtime.      NON FORMULARY Take 4 capsules by mouth in the morning. DEBBY & D-CHIRO INOSITOL- for her PCOS . (Patient not taking: Reported on 5/27/2025)       No current facility-administered medications on file prior to visit.   [2] No Known Allergies  [3]   Past Medical History:   Acne    ADHD    Anxiety    Depression    PCOS (polycystic ovarian syndrome)   [4]   Past Surgical History:  Procedure Laterality Date    Adenoidectomy      Removal of tonsils,12+ y/o      Tonsillectomy

## 2025-05-27 NOTE — PATIENT INSTRUCTIONS
[facility]- Prenatal Testing    The following information is designed to help you understand some of the optional tests that are available to you to screen for problems in your pregnancy.  Before considering any of these tests, you will need to consider the following questions:    [1] What would you do if an abnormality were detected?  If the answer is nothing, then you may decide to decline all testing.  [2] Would you be willing to undergo testing which might increase your risk of miscarriage, such as an amniocentesis or CVS (see below)?  [3] If you have the initial testing, and it indicates that there might be a problem, and you subsequently decide not to do invasive testing such as an amniocentesis, would you worry excessively through the remainder of the pregnancy?    The following tests are available to screen for problems in your pregnancy:      [1]  First Trimester Screening (also called Nuchal Thickness, Nuchal Translucency or NT)- This is an abdominal ultrasound done between 10 and 14 weeks by a perinatology specialist (Maternal Fetal Medicine, MFM) which measures the thickness of the skin behind your baby's neck.  Increased thickness of the skin in this area has been linked to an increased risk of genetic abnormalities like Down Syndrome.  A second visit may be required if the baby is not in the correct position.  The ultrasound results are combined with a finger stick blood test to increase the sensitivity of the test.  You will need to schedule an appointment with the Maternal Fetal Medicine office.  Address and phone number below:  Please verify insurance coverage:  CPT code: 47447 (fowler) or 52271 (twins)  Diagnosis: Genetic Screening- Z36.8A  Maternal Fetal Medicine  Dr. Roblero, Dr. Cota, Dr. Horton, and Dr. Mckay  [address] [facility] [address] [place] [address]  Phone [phone]  Fax [phone]    [2] Cell-Free DNA testing (NIPT)- This is a blood test done any time after 10-11  weeks which screens for genetic abnormalities like Down Syndrome.  It is greater than 98% sensitive but requires an amniocentesis for confirmation if abnormal.  It can also tell you the sex of the baby.  CPT code: 94514  Diagnosis code: Genetic screening- Z36.8A  Testing options:   [employer]- preferred for [facility] patients or all patients.  Please call [phone] or go to [employer]/empower to review billing, prior authorizations or referrals  [id number]- Optional for all insurance plans except [facility].  Please call [facility] at [phone] or go to [employer]patients/cost-#/ to review billing, prior authorizations, or referrals      [3]  Quad Screen (also called AFP-plus or Tetra Screen)-  This is a simple blood test which screens for both genetic abnormalities like Down Syndrome and neural tube defects (NTDs), such as spina bifida (an abnormal opening in the spine which can cause paralysis).  It is usually performed around 16-20 weeks.  If the Quad screen comes back abnormal, it does not mean that your baby has an abnormality.  It only means that there is an increased risk of an abnormality.  Additional testing such as a Level II Ultrasound or Amniocentesis may be recommended (see below).  This test is less accurate at picking up genetic abnormalities than the cell-free DNA test.  If you have test #1 or 2, then usually only the AFP part of the Quad screen would be performed to screen for NTD's (see below).  Please verify insurance coverage:  CPT code: 42707  Diagnosis code: Genetic screening- Z36.8A    [4]  Alpha Fetoprotein (AFP, MSAFP)- This is a simple blood test that screens for neural tube defects such as spina bifida (an abnormal opening in the spine).  It is usually performed around 16-20 weeks.  Additional testing such as a Level II ultrasound may be recommended for an abnormal test result.  Please verify insurance coverage:  CPT code: 57384 Diagnosis code: Genetic Screening-  Z36.8A    ---------------------------------------------------------------------------------------------------------------    Carrier screening:     [5] Cystic Fibrosis/SMA Carrier Screening- Cystic Fibrosis is a genetic disease which causes lung problems in the infant.  SMA (spinal muscular atrophy) is a genetic disease that affects the nerve cells of the spinal cord.  These genetic defects would need to be passed from both parents to the child.  A blood test is performed on the mother, and if her test is positive, then the father should also be tested. These tests can be done at any time in the pregnancy, usually in the first trimester with your initial OB labs.  All babies are screened for cystic fibrosis after birth.  Please verify insurance coverage:  Cystic Fibrosis CPT Code: 77785 Diagnosis code: Cystic Fibrosis screening- Z13.228  SMA CPT Code: 61738 Diagnosis code: Genetic Screening- Z36.8A or Testing for Genetic Disease Carrier- Z13.71    [6] Hemoglobinopathy carrier screening: The hemoglobinopathies are heterogeneous genetic disorders of hemoglobin (Hb) typically inherited in an autosomal recessive pattern. The clinical presentation ranges from asymptomatic in carriers to mild to severe disease in homozygotes and compound heterozygotes. At the severe end of the spectrum, hemoglobinopathies impact quality of life, require life-long care (typically with regular blood transfusions), and can shorten life expectancy. In the [place], the American College of Obstetricians and Gynecologists (ACOG) recommends carrier screening and counseling for genetic conditions, ideally before pregnancy. Firstline for hemoglobinopathy carrier screening, includes a CBC evaluating red cell indices in all pregnant individuals [17]. A hemoglobin electrophoresis (or other protein chemistry method) is performed in addition to a CBC if there is suspicion of hemoglobinopathy based on ethnicity (, Mediterranean, Middle  Eastern, Southeast , or West [place] descent) or if red cell indices show a low mean MCV or MCH. Characteristics other than ethnicity that are associated with a higher risk for an individual to be a hemoglobinopathy carrier include a history of chronic anemia or stillbirth, a relative with a hemoglobin structural variant or thalassemia, and consanguinity In 2022 ACOG updated its recommendations to offer universal hemoglobinopathy testing to people planning pregnancy or at the initial prenatal visit if no prior testing results are available for interpretation. This is based on a high frequency of a hemoglobinopathy trait, in particular S trait, in the [place] and noting that race and self-identified ethnicity are poor alternatives for genetics.  Hemoglobin electrophoresis: Send out lab to [facility].  CPT Code: 22566 or 59515 or 70456 Diagnosis code: Z13.0 Encounter screening for hematological disorder    [employer] Carrier Screening: A carrier screening panel is available that includes cystic fibrosis, spinal muscular atrophy, hemoglobinopathy and additional autosomal recessive or X-linked disorders.  A full review of the carrier screening panel was available via [employer] website. Please call [phone] or go to [employer]/empower to review billing, prior authorizations or referrals.       ---------------------------------------------------------------------------------------------------------------    [7]  Level II Ultrasound-  This is an abdominal ultrasound done at approximately 20-21 weeks by a perinatology specialist (SUZETTE) at [facility] which looks at many of the baby's internal structures.  Abnormalities in certain structures have been associated with an increased risk of genetic abnormalities.  It also screens for NTD's.  This ultrasound would replace the Level I Ultrasound that we normally perform at our office around the same time.    [8]  Amniocentesis-  During this procedure, a needle is  passed through your abdominal wall to withdrawal some amniotic fluid from around the baby.  It screens for both genetic abnormalities and NTD's and is usually performed around 15-16 weeks.  This test has the highest accuracy for detecting genetic abnormalities, but there may be a small risk of miscarriage or other complications.  A similar procedure called Chorionic Villus Sampling (CVS) is performed by passing a catheter through the vagina to remove a small sample of tissue from the placenta (afterbirth).  CVS may have a higher risk of miscarriage and other rare complications compared to amniocentesis but can be performed earlier in pregnancy.  Amniocentesis is often recommended/offered if any of the previously mentioned tests come back abnormal.  A pamphlet is available if you would like more information about this option.  If you decide to have an amniocentesis, the other tests would be unnecessary.      The above information covers some of the basics.  Pamphlets on the Cell-Free DNA test, Quad Screen and Cystic Fibrosis test should be in your prenatal packet.  Your doctor will discuss this information in more detail, and feel free to ask additional questions.  Also, remember that all of these tests are optional, and will only be performed at your request.  Testing that needs to be done through the perinatologist's office may require 2-3 weeks lead time to schedule.     Foods to Avoid During Pregnancy  Deli Meats- You may eat deli meats from the deli.  If you eat deli meats in the package, it may be contaminated with Listeria. Heat all deli meats in a package to 165 degrees Fahrenheit before eating, even if the label states the meat is “pre-cooked”.    Soft Cheeses and Unpasteurized Products - You may eat soft cheeses that are pasteurized.  Please avoid all soft cheeses, milk or juice that is unpasteurized.  Fish- avoid fish that contains a high level of mercury. These include but are not limited to; Sonia  Orange Roughy, Tile Fish, Shark, Swordfish, and Dhaval Mackerel. Please see chart below for good fish choices in pregnancy. Also avoid any raw, uncooked shellfish such as oysters, clams, or sushi.    Make sure to cook all meats; including ground beef, pork, and poultry to their desired temperatures before consuming. This reduces the chance of a food borne illness.  Caffeine- limit to 200 mg or an 8oz cup daily or less.   Alcohol- no amount of alcohol is determined to be safe in pregnancy. Do not drink any alcoholic beverages while pregnant.  Medications Safe in Pregnancy  The following over-the-counter medications may be taken safely after 12 weeks gestation by any patient who is pregnant.  Please follow the instructions on the package for adult dosage.  If you experience any symptoms prior to 12 weeks, please call the office at [phone].      Colds/Congestion: Flonase, Saline Nasal Spray, Neti Pot, Plain Robitussin, Robitussin DM, Mucinex DM, Vicks 44 E, Vicks Vapor rub, Cough drops without Zinc or Sudafed.   Contact your doctor if you have a persistent fever over 100.4, shortness of breath, coughing up green mucus, or a sore throat that persists from more than 3 days    Diarrhea: Imodium, Maalox Anti-Diarrheal or Kaopectate  Contact the office if you have diarrhea for more than 3 days.    Allergies: Benadryl, Claritin or Zyrtec    Hemorrhoids: Tucks pads, Preparation H, Annusol, Sitz bath or Witch hazel  Eat a high fiber diet and drink plenty of fluids.    Yeast: Monistat 3 or 7  Call if your symptoms do not improve, or if you experience vaginal bleeding, or a watery discharge.    Constipation: Metamucil, Colace, fiber supplement, Milk of Magnesia or Dulcolax  Drink plenty of fluids, eat high-fiber foods and take the above laxatives sporadically.     Headache or Mild aches and pain: Extra Strength Tylenol     Gas: Gas X, Gelusil, Papaya Tablets, Maalox, Mylicon or Mylanta Gas    Heartburn: Tums, Mylanta, Pepcid  AC or Maalox    Sore throat: Alcohol free Chloraseptic spray or Lozenges     Nausea and Vomiting: ½ tablet Unisom plus 100mg of Vitamin B6 at bedtime (may increase B6 up to 200mg per day), Shanique root tea or raspberry leaf tea    Insomnia: Tylenol PM, Vitamin B6 50mg, warm bath or milk, Unisom, Nytol or Sominex.     We have listed a few medications for common symptoms seen in pregnancy.  Please contact the office if you are unsure about any over the counter medications that are not listed above.

## 2025-05-29 ENCOUNTER — PATIENT MESSAGE (OUTPATIENT)
Dept: OBGYN CLINIC | Facility: CLINIC | Age: 25
End: 2025-05-29

## 2025-05-29 NOTE — TELEPHONE ENCOUNTER
I spoke with patient, she continues to have light bleeding only when she wipes, denies period-like cramping. Advised patient to follow 2 weeks of pelvic rest & continue to monitor. Go to ED if period-like bleeding or cramping develop. Patient expresses understanding.

## 2025-05-29 NOTE — TELEPHONE ENCOUNTER
Pt calling to f/u on her [employer] message sent earlier today, currently pregnant in 1st trimester and bleeding not heavy but dark red and mild cramping. Would like to speak to nurse - pls call cell phone #

## 2025-06-21 ENCOUNTER — HOSPITAL ENCOUNTER (EMERGENCY)
Facility: HOSPITAL | Age: 25
Discharge: HOME | End: 2025-06-21
Attending: EMERGENCY MEDICINE
Payer: COMMERCIAL

## 2025-06-21 ENCOUNTER — APPOINTMENT (OUTPATIENT)
Dept: ULTRASOUND IMAGING | Facility: HOSPITAL | Age: 25
End: 2025-06-21
Attending: EMERGENCY MEDICINE
Payer: COMMERCIAL

## 2025-06-21 VITALS
TEMPERATURE: 100 F | HEART RATE: 86 BPM | OXYGEN SATURATION: 100 % | BODY MASS INDEX: 22 KG/M2 | DIASTOLIC BLOOD PRESSURE: 68 MMHG | SYSTOLIC BLOOD PRESSURE: 108 MMHG | WEIGHT: 125 LBS | RESPIRATION RATE: 14 BRPM

## 2025-06-21 DIAGNOSIS — O03.4 INCOMPLETE MISCARRIAGE (HCC): Primary | ICD-10-CM

## 2025-06-21 LAB
ALBUMIN SERPL-MCNC: 4.6 G/DL (ref 3.2–4.8)
ALBUMIN/GLOB SERPL: 1.8 {RATIO} (ref 1–2)
ALP LIVER SERPL-CCNC: 51 U/L (ref 37–98)
ALT SERPL-CCNC: 13 U/L (ref 10–49)
ANION GAP SERPL CALC-SCNC: 10 MMOL/L (ref 0–18)
AST SERPL-CCNC: 22 U/L (ref ?–34)
BASOPHILS # BLD AUTO: 0.02 X10(3) UL (ref 0–0.2)
BASOPHILS NFR BLD AUTO: 0.2 %
BILIRUB SERPL-MCNC: 0.5 MG/DL (ref 0.3–1.2)
BUN BLD-MCNC: 9 MG/DL (ref 9–23)
CALCIUM BLD-MCNC: 9.3 MG/DL (ref 8.7–10.6)
CHLORIDE SERPL-SCNC: 108 MMOL/L (ref 98–112)
CO2 SERPL-SCNC: 24 MMOL/L (ref 21–32)
CREAT BLD-MCNC: 0.69 MG/DL (ref 0.55–1.02)
EGFRCR SERPLBLD CKD-EPI 2021: 123 ML/MIN/1.73M2 (ref 60–?)
EOSINOPHIL # BLD AUTO: 0.11 X10(3) UL (ref 0–0.7)
EOSINOPHIL NFR BLD AUTO: 1 %
ERYTHROCYTE [DISTWIDTH] IN BLOOD BY AUTOMATED COUNT: 11.6 %
GLOBULIN PLAS-MCNC: 2.5 G/DL (ref 2–3.5)
GLUCOSE BLD-MCNC: 113 MG/DL (ref 70–99)
HCT VFR BLD AUTO: 37.7 % (ref 35–48)
HGB BLD-MCNC: 14 G/DL (ref 12–16)
IMM GRANULOCYTES # BLD AUTO: 0.09 X10(3) UL (ref 0–1)
IMM GRANULOCYTES NFR BLD: 0.9 %
LYMPHOCYTES # BLD AUTO: 1.88 X10(3) UL (ref 1–4)
LYMPHOCYTES NFR BLD AUTO: 17.9 %
MCH RBC QN AUTO: 31.9 PG (ref 26–34)
MCHC RBC AUTO-ENTMCNC: 37.1 G/DL (ref 31–37)
MCV RBC AUTO: 85.9 FL (ref 80–100)
MONOCYTES # BLD AUTO: 0.45 X10(3) UL (ref 0.1–1)
MONOCYTES NFR BLD AUTO: 4.3 %
NEUTROPHILS # BLD AUTO: 7.95 X10 (3) UL (ref 1.5–7.7)
NEUTROPHILS # BLD AUTO: 7.95 X10(3) UL (ref 1.5–7.7)
NEUTROPHILS NFR BLD AUTO: 75.7 %
OSMOLALITY SERPL CALC.SUM OF ELEC: 293 MOSM/KG (ref 275–295)
PLATELET # BLD AUTO: 310 10(3)UL (ref 150–450)
POTASSIUM SERPL-SCNC: 3.9 MMOL/L (ref 3.5–5.1)
PROT SERPL-MCNC: 7.1 G/DL (ref 5.7–8.2)
RBC # BLD AUTO: 4.39 X10(6)UL (ref 3.8–5.3)
SODIUM SERPL-SCNC: 142 MMOL/L (ref 136–145)
WBC # BLD AUTO: 10.5 X10(3) UL (ref 4–11)

## 2025-06-21 PROCEDURE — 36415 COLL VENOUS BLD VENIPUNCTURE: CPT

## 2025-06-21 PROCEDURE — 80053 COMPREHEN METABOLIC PANEL: CPT | Performed by: EMERGENCY MEDICINE

## 2025-06-21 PROCEDURE — 99284 EMERGENCY DEPT VISIT MOD MDM: CPT

## 2025-06-21 PROCEDURE — 76817 TRANSVAGINAL US OBSTETRIC: CPT | Performed by: EMERGENCY MEDICINE

## 2025-06-21 PROCEDURE — 76801 OB US < 14 WKS SINGLE FETUS: CPT | Performed by: EMERGENCY MEDICINE

## 2025-06-21 PROCEDURE — 85025 COMPLETE CBC W/AUTO DIFF WBC: CPT | Performed by: EMERGENCY MEDICINE

## 2025-06-21 PROCEDURE — 99285 EMERGENCY DEPT VISIT HI MDM: CPT

## 2025-06-21 NOTE — DISCHARGE INSTRUCTIONS
Return to the emergency department for bleeding more than a pad an hour for 3 hours in a row.    Follow-up with your obstetrician on Monday.    No tampons, douching or intercourse.

## 2025-06-21 NOTE — ED PROVIDER NOTES
Patient Seen in: [facility] Emergency Department        History  Chief Complaint   Patient presents with    Abnormal Result     Stated Complaint: abn us no fht    Subjective:   HPI            25-year-old female  1 para 0 estimated gestational age of 11 weeks presents to the emergency department after an outside ultrasound facility found no evidence of fetal heart tones and assess the fetus to be only 9 weeks size.  Patient has had no vaginal bleeding.  She did have some sharp lower abdominal and left lower quadrant pain a week ago which radiated to the left flank and then resolved.  Last ultrasound measured 7-1/2 weeks 3-1/2 weeks ago.  The patient had some vaginal spotting from a subchorionic hemorrhage prior to that date.  She states that she had breast tenderness early in the pregnancy but no longer feels the same.      Objective:     Past Medical History:    Acne    ADHD    Anxiety    Depression    PCOS (polycystic ovarian syndrome)              Past Surgical History:   Procedure Laterality Date    Adenoidectomy      Removal of tonsils,12+ y/o      Tonsillectomy                  Social History     Socioeconomic History    Marital status: Single   Tobacco Use    Smoking status: Former     Types: Cigarettes    Smokeless tobacco: Never   Vaping Use    Vaping status: Never Used   Substance and Sexual Activity    Alcohol use: Not Currently    Drug use: Not Currently    Sexual activity: Yes     Partners: Male   Other Topics Concern    Caffeine Concern Yes    Exercise Yes    Seat Belt Yes    Special Diet No    Stress Concern No    Weight Concern Yes     Social Drivers of Health     Food Insecurity: No Food Insecurity (5/10/2025)    NCSS - Food Insecurity     Worried About Running Out of Food in the Last Year: No     Ran Out of Food in the Last Year: No   Transportation Needs: No Transportation Needs (5/10/2025)    NCSS - Transportation     Lack of Transportation: No   Housing Stability: Not At Risk  (5/10/2025)    NCSS - Housing/Utilities     Has Housing: Yes     Worried About Losing Housing: No     Unable to Get Utilities: No                                Physical Exam    ED Triage Vitals [06/21/25 1211]   /87   Pulse 114   Resp 14   Temp 99.9 °F (37.7 °C)   Temp src Temporal   SpO2 100 %   O2 Device None (Room air)       Current Vitals:   Vital Signs  BP: 108/68  Pulse: 86  Resp: 14  Temp: 99.9 °F (37.7 °C)  Temp src: Temporal  MAP (mmHg): 81    Oxygen Therapy  SpO2: 100 %  O2 Device: None (Room air)            Physical Exam     General Appearance: This is a young adult female sitting on a gurney.  Vital signs were reviewed per nurses notes.  Temperature is 99.9.  Heart rate is 114.  HEENT: Normocephalic/atraumatic.  Anicteric sclera.  Oral mucosa is moist.  Oropharynx is normal.  Neck: No adenopathy or thyromegaly.  Lungs are clear to auscultation.  Heart exam: Normal S1-S2 without extra sounds or murmurs.  Regular rate and rhythm.  Abdomen is soft and nontender.  Extremities are atraumatic.  Skin is dry without rashes or lesions.  Neuroexam: Awake, conversive and moving all 4 extremities well.      ED Course  Labs Reviewed   COMP METABOLIC PANEL (14) - Abnormal; Notable for the following components:       Result Value    Glucose 113 (*)     All other components within normal limits   CBC WITH DIFFERENTIAL WITH PLATELET - Abnormal; Notable for the following components:    MCHC 37.1 (*)     Neutrophil Absolute Prelim 7.95 (*)     Neutrophil Absolute 7.95 (*)     All other components within normal limits   RAINBOW DRAW LAVENDER   RAINBOW DRAW LIGHT GREEN   RAINBOW DRAW BLUE          Intravenous access was obtained.  Laboratory studies were drawn.    Review of the EMR indicates that the patient is Rh+.  She has been seen during this pregnancy by obstetrician Gale Santizo.    US PREG 1ST TRIM W/EV (CPT=76801/09263)  Result Date: 6/21/2025  PROCEDURE:  US OB W/ EV 1ST TRIMESTER (CPT=76801/98845)   COMPARISON:  [facility], US PREG 1ST TRIM W/EV (CPT=76801/26116), 5/12/2025, 8:41 PM.  INDICATIONS:  Outside ultrasound today showed no fetal heart tones.  Evaluate for fetal viability.  TECHNIQUE:  Transabdominal and endovaginal pelvic ultrasound examinations were performed.  PATIENT STATED HISTORY: (As transcribed by Technologist)      FINDINGS:  Midline uterus.  10.5 x 7.6 x 7.8 cm.  Intrauterine gestation.  Sac in typical position with smooth contours.  0.3 cm yolk sac.  Fetal pole identified with no detected cardiac activity.  Using mean crown-rump length, 2.0 cm, age estimation 8 weeks and 4 days.  LMP estimation 11 weeks and 4 days.  There is a hypoechoic area consistent with a subchorionic hemorrhage measuring 2.2 x 0.4 x 1.3 cm.    Right ovary 2.7 x 1.3 x 2.7 cm, within the limits of normal.  Left ovary 3.1 x 2.1 x 2.6 cm.  2.4 cm cyst with simple features the functional in nature.  Minimal free fluid in the pelvis.                CONCLUSION:  Current sonographic findings support a nonviable early intrauterine gestation.  Continued clinical correlation recommended.   LOCATION:  [facility]   Dictated by (CST): Ashu Bishop MD on 6/21/2025 at 2:17 PM     Finalized by (CST): Ashu Bishop MD on 6/21/2025 at 2:19 PM       Pelvic US Abdominal GYNE Only [93202]  First Trimester US GA by LMP: 8w0d GA by US: 7w4d FHT: 150 bpm Impression: Single live IUP. Early viable. Gestational sac, yolk sac and fetal pole seen. Both ovaries wnl. No free fluid. Cardiac activity noted. Cervix appears closed and wnl. TABATHA 01/06/2026 by LMP c/w 7 wk US Reviewed and electronically signed by: Gale Santizo DO, 05/27/25, 4:54 PM Note to patient and family The 21st Century Cures Act makes medical notes available to patients in the interest of transparency.  However, please be advised that this is a medical document.  It is intended as fvhn-jh-cphd communication.  It is written and medical language may contain abbreviations or verbiage  that are technical and unfamiliar.  It may appear blunt or direct.  Medical documents are intended to carry relevant information, facts as evident, and the clinical opinion of the practitioner.     I personally reviewed the images myself and went over results with patient.    I viewed the pelvic ultrasound films from today myself and there is no evidence of fetal heart tones.  Findings consistent with fetal demise of 11-week pregnancy.    Case was discussed with the patient's obstetrician Dr. Santizo.    Test results and treatment plan were discussed with the patient including criteria for return to the emergency department.                MDM     #1.  Incomplete miscarriage.  Bleeding precautions provided.  Advised to stay on pelvic rest and work release provided until the patient can resolve her current situation.  She is scheduled to follow-up with OB by phone on Monday.  May require D&C if she does not abort spontaneously.        Medical Decision Making      Disposition and Plan     Clinical Impression:  1. Incomplete miscarriage (HCC)         Disposition:  Discharge  6/21/2025  2:41 pm    Follow-up:  Gale Santizo, [address] [address] [phone]    Call in 2 day(s)            Medications Prescribed:  Discharge Medication List as of 6/21/2025  2:43 PM                Supplementary Documentation:

## 2025-06-21 NOTE — ED INITIAL ASSESSMENT (HPI)
Pt presents to ed from an ultrasound in which the pt is 11weeks pregnant and was found to be measuring 9 weeks with no fetal heart tone noted.

## 2025-06-23 ENCOUNTER — TELEPHONE (OUTPATIENT)
Dept: OBGYN CLINIC | Facility: CLINIC | Age: 25
End: 2025-06-23

## 2025-06-23 NOTE — TELEPHONE ENCOUNTER
6/21/25 ED visit for incomplete miscarriage  US Results: No fetal heart tones, estimate 8w4d.  Per LMP 11w4d    Denies vaginal bleeding, abdominal pain.  ED Precautions for pain and bleeding reviewed.   Appt scheduled on 6/24/25

## 2025-06-24 ENCOUNTER — OFFICE VISIT (OUTPATIENT)
Dept: OBGYN CLINIC | Facility: CLINIC | Age: 25
End: 2025-06-24
Payer: COMMERCIAL

## 2025-06-24 ENCOUNTER — TELEPHONE (OUTPATIENT)
Dept: OBGYN CLINIC | Facility: CLINIC | Age: 25
End: 2025-06-24

## 2025-06-24 VITALS
WEIGHT: 125.38 LBS | BODY MASS INDEX: 22.21 KG/M2 | DIASTOLIC BLOOD PRESSURE: 72 MMHG | HEART RATE: 83 BPM | SYSTOLIC BLOOD PRESSURE: 110 MMHG | HEIGHT: 63 IN

## 2025-06-24 DIAGNOSIS — O03.9 MISCARRIAGE (HCC): Primary | ICD-10-CM

## 2025-06-24 PROCEDURE — 99213 OFFICE O/P EST LOW 20 MIN: CPT | Performed by: STUDENT IN AN ORGANIZED HEALTH CARE EDUCATION/TRAINING PROGRAM

## 2025-06-24 RX ORDER — IBUPROFEN 600 MG/1
600 TABLET, FILM COATED ORAL EVERY 6 HOURS PRN
Qty: 30 TABLET | Refills: 0 | Status: SHIPPED | OUTPATIENT
Start: 2025-06-24

## 2025-06-24 RX ORDER — MISOPROSTOL 200 UG/1
800 TABLET ORAL ONCE
Qty: 4 TABLET | Refills: 0 | Status: SHIPPED | OUTPATIENT
Start: 2025-06-24 | End: 2025-06-24

## 2025-06-24 RX ORDER — OXYCODONE HYDROCHLORIDE 5 MG/1
5 TABLET ORAL EVERY 4 HOURS PRN
Qty: 5 TABLET | Refills: 0 | Status: SHIPPED | OUTPATIENT
Start: 2025-06-24

## 2025-06-24 NOTE — TELEPHONE ENCOUNTER
Pt. Asking about pain medication that was suppose to be sent in for her to pharmacy.  Pt. did  Misoprostol, but pain medication had been at pharmacy.and her ov noted not completed yet.  Please send in for her.  Thanks

## 2025-06-24 NOTE — PROGRESS NOTES
GYN PROBLEM VISIT    Subjective:   This is a 25 year old  presenting for GYN visit. She had a peak a belly ultrasound and no FHTs were detected. She presented to the ED and had an ultrasound with the following results:    FINDINGS:  Midline uterus.  10.5 x 7.6 x 7.8 cm.  Intrauterine gestation.  Sac in typical position with smooth contours.  0.3 cm yolk sac.  Fetal pole identified with no detected cardiac activity.  Using mean crown-rump length, 2.0 cm, age estimation 8  weeks and 4 days.  LMP estimation 11 weeks and 4 days.  There is a hypoechoic area consistent with a subchorionic hemorrhage measuring 2.2 x 0.4 x 1.3 cm.       Right ovary 2.7 x 1.3 x 2.7 cm, within the limits of normal.  Left ovary 3.1 x 2.1 x 2.6 cm.  2.4 cm cyst with simple features the functional in nature.  Minimal free fluid in the pelvis.                              Impression   CONCLUSION:  Current sonographic findings support a nonviable early intrauterine gestation.  Continued clinical correlation recommended.          She had a normal ultrasound with a viable IUP :    First Trimester US   GA by LMP: 8w0d   GA by US: 7w4d   FHT: 150 bpm   Impression: Single live IUP. Early viable. Gestational sac, yolk sac and fetal pole seen. Both ovaries wnl. No free fluid. Cardiac activity noted. Cervix appears closed and wnl.   TABATHA 2026 by LMP c/w 7 wk US     No significant cramping bleeding.       Review of Systems   Constitutional: Negative.    HENT: Negative.    Respiratory: Negative.    Gastrointestinal: Negative.    Endocrine: Negative.    Genitourinary: Negative.    Musculoskeletal: Negative.    Skin: Negative.    Allergic/Immunologic: Negative.    Neurological: Negative.      Past Medical History[1]    Past Surgical History[2]    Allergies[3]    Current Medications[4]      Objective:    Physical Exam     Vitals:    25 1100   BP: 110/72   Pulse: 83        Constitutional: She is oriented to person, place, and time. She  appears well-developed and well-nourished.     Assessment/Plan:  This is a 25 year old  presenting with missed .     Missed AB  - US c/w with diagnosis of missed AB  - OB US reviewed and d/w patient    - d/w patient management options including observation versus medical versus surgical   - d/w patient risks, benefits and alternatives of each form of management including but not limited to risks of infection, bleeding, injury, management failure, emergent/urgent surgery relating to medical management and uterine adhesions relating to surgical management   - pt declined surgical management at this time  - pt reports she would like to try medical management   - risks, benefits and alternatives to medical management were discussed including but not limited to risk of heavy vaginal bleeding, severe abdominal pain, failed medical management, prolongation of SAB, RPOC and emergent services including surgical management with dilation and suction curettage   - pt agreeable for medical management  - Rx provided for Cytotec 800 mcg per vagina x 1 dose  - Rx provided for Motrin 600 mg PO q 6 hr PRN, advise to use for heavy bleeding and pain   - Rx provided for Roxicodone  - ED precautions provided   - advised to follow up in 2-4 week with repeat OB US   - advised to contact office if initial dose fails to discuss if repeat dose indicated   -Hg wnl in the ED. She is B+    Pt has a hx of positive LAURA. More specific testing (ie SSA/B/ISABELLA/etc) was negative. She asks about autoimmune conditions leading to pregnancy loss. Discussed antiphospholipid antibody screening; however, she does not meet the obstetric criteria after 1 pregnancy loss. She asks about progesterone supplementation. However, I discussed that typically this is offered after multiple first trimester losses. I discussed that the most likely reason for her loss was a genetic abnormality. However, she can see an [facility] doctor to discuss further testing.  However, additional workup may be without benefits; statistically speaking, her next pregnancy will not result in miscarriage. [facility] referral placed.     Christophe Clark MD         [1]   Past Medical History:   Acne    ADHD    Anxiety    Depression    PCOS (polycystic ovarian syndrome)   [2]   Past Surgical History:  Procedure Laterality Date    Adenoidectomy      Removal of tonsils,12+ y/o      Tonsillectomy     [3] No Known Allergies  [4]    Omega-3 Fatty Acids (MEGARED ADVANCED OMEGA-3 OR) Take 2 capsules by mouth in the morning.      Prenatal Vit-Fe Fumarate-FA (MULTI PRENATAL) 27-0.8 MG Oral Tab Take 1 capsule by mouth in the morning, at noon, and at bedtime.

## 2025-06-24 NOTE — TELEPHONE ENCOUNTER
Called and spoke  with pt. earlier and per  let pt. know that she did not need to get her labs drawn that Dr. Clark had ordered at her appt. today .  Pt. had same labs done in ER on 6/21/2025 and did not need to be repeated.  Pt. Verbalizes understanding and agrees to plan.

## 2025-06-25 ENCOUNTER — PATIENT MESSAGE (OUTPATIENT)
Dept: OBGYN CLINIC | Facility: CLINIC | Age: 25
End: 2025-06-25

## 2025-06-25 ENCOUNTER — TELEPHONE (OUTPATIENT)
Dept: OBGYN CLINIC | Facility: CLINIC | Age: 25
End: 2025-06-25

## 2025-06-25 DIAGNOSIS — O02.1 MISSED ABORTION (HCC): Primary | ICD-10-CM

## 2025-06-25 PROCEDURE — 88233 TISSUE CULTURE SKIN/BIOPSY: CPT

## 2025-06-25 PROCEDURE — 88262 CHROMOSOME ANALYSIS 15-20: CPT

## 2025-06-25 PROCEDURE — 88291 CYTO/MOLECULAR REPORT: CPT

## 2025-06-25 NOTE — TELEPHONE ENCOUNTER
Patient called to ask about Cytotec. Info given. She was asking if she brings tissue to the lab does it need to be refrigerated. Informed patient it does not need refrigeration, try to bring it to [facility] lab the same day if possible.           To answer your question regarding the medication management option with the Cytotec pill:      Cramping usually starts 1-7 hours after placement     Bleeding usually starts within 10 hours of placement     Heavy bleeding usually lasts 1-4 hours

## 2025-06-26 ENCOUNTER — LAB ENCOUNTER (OUTPATIENT)
Dept: LAB | Age: 25
End: 2025-06-26
Attending: STUDENT IN AN ORGANIZED HEALTH CARE EDUCATION/TRAINING PROGRAM
Payer: COMMERCIAL

## 2025-06-26 DIAGNOSIS — O03.9 MISCARRIAGE (HCC): ICD-10-CM

## 2025-06-26 PROCEDURE — 81229 CYTOG ALYS CHRML ABNR SNPCGH: CPT

## 2025-06-26 PROCEDURE — 88305 TISSUE EXAM BY PATHOLOGIST: CPT

## 2025-06-26 PROCEDURE — 88381 MICRODISSECTION MANUAL: CPT

## 2025-06-26 RX ORDER — MISOPROSTOL 200 UG/1
800 TABLET ORAL ONCE
Qty: 4 TABLET | Refills: 0 | Status: SHIPPED | OUTPATIENT
Start: 2025-06-26 | End: 2025-06-26

## 2025-06-26 NOTE — TELEPHONE ENCOUNTER
Pt calling to speak with nurse regarding medication taken (misoprostol) see detail note from pt below. Pls call via cell phone #.

## 2025-06-26 NOTE — TELEPHONE ENCOUNTER
Routed to Dr. Lua- please review [employer] messages and advise if patient needs to repeat misoprostol for medication management of miscarriage.    Last ordered:  Medication Quantity Refills Start End   miSOPROStol (CYTOTEC) 200 MCG Oral Tab () 4 tablet 0 2025   Sig:   Place 4 tablets (800 mcg total) vaginally one time for 1 dose.     Route:   Vaginal

## 2025-06-27 ENCOUNTER — LAB ENCOUNTER (OUTPATIENT)
Dept: LAB | Facility: HOSPITAL | Age: 25
End: 2025-06-27
Attending: STUDENT IN AN ORGANIZED HEALTH CARE EDUCATION/TRAINING PROGRAM
Payer: COMMERCIAL

## 2025-06-27 DIAGNOSIS — O03.9 MISCARRIAGE (HCC): Primary | ICD-10-CM

## 2025-07-08 ENCOUNTER — LAB ENCOUNTER (OUTPATIENT)
Dept: LAB | Age: 25
End: 2025-07-08
Attending: STUDENT IN AN ORGANIZED HEALTH CARE EDUCATION/TRAINING PROGRAM
Payer: COMMERCIAL

## 2025-07-08 ENCOUNTER — OFFICE VISIT (OUTPATIENT)
Dept: OBGYN CLINIC | Facility: CLINIC | Age: 25
End: 2025-07-08
Payer: COMMERCIAL

## 2025-07-08 ENCOUNTER — ULTRASOUND ENCOUNTER (OUTPATIENT)
Dept: OBGYN CLINIC | Facility: CLINIC | Age: 25
End: 2025-07-08

## 2025-07-08 VITALS
BODY MASS INDEX: 23.04 KG/M2 | HEIGHT: 63 IN | HEART RATE: 70 BPM | WEIGHT: 130 LBS | DIASTOLIC BLOOD PRESSURE: 80 MMHG | SYSTOLIC BLOOD PRESSURE: 108 MMHG

## 2025-07-08 DIAGNOSIS — O03.9 MISCARRIAGE (HCC): Primary | ICD-10-CM

## 2025-07-08 DIAGNOSIS — O03.9 MISCARRIAGE (HCC): ICD-10-CM

## 2025-07-08 LAB — B-HCG SERPL-ACNC: 82.7 MIU/ML (ref ?–4.2)

## 2025-07-08 PROCEDURE — 76856 US EXAM PELVIC COMPLETE: CPT | Performed by: STUDENT IN AN ORGANIZED HEALTH CARE EDUCATION/TRAINING PROGRAM

## 2025-07-08 PROCEDURE — 36415 COLL VENOUS BLD VENIPUNCTURE: CPT

## 2025-07-08 PROCEDURE — 99213 OFFICE O/P EST LOW 20 MIN: CPT | Performed by: STUDENT IN AN ORGANIZED HEALTH CARE EDUCATION/TRAINING PROGRAM

## 2025-07-08 PROCEDURE — 84702 CHORIONIC GONADOTROPIN TEST: CPT

## 2025-07-08 RX ORDER — SENNOSIDES 8.6 MG
TABLET ORAL
COMMUNITY

## 2025-07-08 NOTE — PROGRESS NOTES
[facility]  Obstetrics and Gynecology  Follow Up Progress Note    Subjective:     Lulú Pulliam is a 25 year old  female who was last seen in office 2025 for miscarriage and presents today s/p medication management for missed . US today was benign and showed no evidence of retained POC. The patient reports minimal spotting and denies pain or cramping. She is tearful and concerned about how this miscarriage happened.     Review of Systems:  General: no complaints per category. See HPI for additional information.   Breast: no complaints per category. See HPI for additional information.   Respiratory: no complaints per category. See HPI for additional information.   Cardiovascular: no complaints per category. See HPI for additional information.   GI: no complaints per category. See HPI for additional information.   : no complaints per category. See HPI for additional information.   Heme: no complaints per category. See HPI for additional information.     OB History    Para Term  AB Living   1 0 0 0 1 0   SAB IAB Ectopic Multiple Live Births   0 0 0 0 0      # Outcome Date GA Lbr Vasu/2nd Weight Sex Type Anes PTL Lv   1 AB                  Gyne History:  Menarche: 11 years  Period Cycle (Days): recent miscarriage  Period Duration (Days): na  Period Flow: na  Use of Birth Control (if yes, specify type): None  Hx Prior Abnormal Pap: No  Pap Date: 22  Pap Result Notes: 22 wnl  Patient's last menstrual period was 2025 (exact date).      Meds:  Medications Ordered Prior to Encounter[1]    All:  Allergies[2]    PMH:  Past Medical History[3]    PSH:  Past Surgical History[4]      Objective:     Vitals:    25 1028   BP: 108/80   Pulse: 70   Weight: 130 lb (59 kg)   Height: 63\"         Body mass index is 23.03 kg/m².    General: AAO.NAD.   CVS exam: normal peripheral perfusion  Chest: non-labored breathing, no tachypnea   Breast: deferred  Abdominal exam:  soft, nontender, nondistended  Pelvic exam: deferred  Ext: non-tender, no edema    Labs:  2025: blood clots  2025: - Immature chorionic villi with degenerative changes, villous scalloping, and rare trophoblastic inclusions, see comment.  - Fetal membranes and fetal tissue present.    Imaging:  Pelvic US (transvaginal)   Normal appearing uterus measuring 8.71 x 4.58 x 4.10 cm, anteverted   Endometrial thickness 5.10 mm   Cul de sac with minimal free fluid, overall normal, no increased flow   no fibroids    Both ovaries appear wnl with good flow color seen     Impression: normal pelvic US, no IUP seen, no retained products of conception. Would correlate with weekly bHCGs after miscarriage and medical management for spontaneous .     Reviewed and electronically signed by: Gale Santizo DO, 25, 10:45 AM        Assessment:     Lulú Pulliam is a 25 year old  female who presents for follow up after medication management for missed         Plan:     Problem List Items Addressed This Visit    None  Visit Diagnoses         Miscarriage (HCC)    -  Primary    Relevant Orders    HCG, Beta Subunit (Quant Pregnancy Test) (Completed)    US PELVIS, ABDOMINAL GYNE EMG ONLY (Completed)              Missed   - pt with diagnosed missed AB on 2025 and s/p medication management and subsequent passage of POC  - US today notable for normal appearing uterus with no evidence of retained POC  - physical exam unremarkable today, pt with minimal spotting  - recommend weekly trending of bHCG until <5, then advised trying for pregnancy after return of 1 normal menstrual cycle; order placed  - d/w pt that up to 50% of miscarriages are due to genetic incompatibilities; offered condolences and reassurance that subsequent pregnancy would be rare to result in miscarriage     All of the findings and plan were discussed with the patient.  She notes understanding and agrees with the plan of  care.  All questions were answered to the best of my ability at this time.      Total patient time was 15 minutes in evaluation, consultation, and coordination of care. This included face to face and non-face to face actions. The patient's questions and concerns were addressed.       RTC if new or worsening symptoms       Gale Santizo DO   [facility]       Discussed with patient that there will not be further notification of normal or benign results other than receiving results on mychart. A mychart message or telephone call will be placed by the physician and/or office staff if results are abnormal.     Note to patient and family   The 21st Century Cures Act makes medical notes available to patients in the interest of transparency.  However, please be advised that this is a medical document.  It is intended as azpi-st-xkkj communication.  It is written and medical language may contain abbreviations or verbiage that are technical and unfamiliar.  It may appear blunt or direct.  Medical documents are intended to carry relevant information, facts as evident, and the clinical opinion of the practitioner.        This note could include assistance by Dragon voice recognition. Errors in content may be related to improper recognition by the system; efforts to review and correct have been done but errors may still exist.          [1]   Current Outpatient Medications on File Prior to Visit   Medication Sig Dispense Refill    Coenzyme Q10 (COQ-10) 400 MG Oral Cap Take by mouth.      oxyCODONE 5 MG Oral Tab Take 1 tablet (5 mg total) by mouth every 4 (four) hours as needed for Pain. 5 tablet 0    Omega-3 Fatty Acids (MEGARED ADVANCED OMEGA-3 OR) Take 2 capsules by mouth in the morning.      Prenatal Vit-Fe Fumarate-FA (MULTI PRENATAL) 27-0.8 MG Oral Tab Take 1 capsule by mouth in the morning, at noon, and at bedtime.      ibuprofen 600 MG Oral Tab Take 1 tablet (600 mg total) by mouth every 6 (six) hours as needed for  Pain. (Patient not taking: Reported on 7/8/2025) 30 tablet 0    NON FORMULARY Take 4 capsules by mouth in the morning. DEBBY & D-CHIRO INOSITOL- for her PCOS . (Patient not taking: Reported on 7/8/2025)       No current facility-administered medications on file prior to visit.   [2] No Known Allergies  [3]   Past Medical History:   Acne    ADHD    Anxiety    Depression    PCOS (polycystic ovarian syndrome)   [4]   Past Surgical History:  Procedure Laterality Date    Adenoidectomy      Removal of tonsils,12+ y/o      Tonsillectomy

## 2025-07-08 NOTE — PATIENT INSTRUCTIONS
Pregnancy Loss: Grieving for Your Child   Losing a child through miscarriage or stillbirth is a painful experience. Grief is a normal reaction to this loss. You may not want to believe the loss is real. You may feel numb, in shock, or angry. You may even think you could have somehow stopped the loss. At first, it may feel impossible to get through each day. But you will feel better with time, as you let yourself grieve.   Emotional and physical changes  Grief is complex. The grieving process varies from person to person. It can cause a lot of emotional and mental changes, such as:   Feeling sad, depressed, hopeless, or angry  Feeling worried, guilty, anxious, or afraid  Trouble with memory and thinking  Not wanting to talk with or be around other people  Loss of interest in things you used to enjoy  Loss of interest in your life and work  Grief can also cause physical changes, such as:  Loss of appetite or overeating  Weight loss or gain  Tiredness  Trouble getting to sleep or staying asleep  Headaches, upset stomach, or hair loss  Sense of trouble breathing  Trembling or shakiness  Caring for yourself  While you are grieving, you need to take care of yourself. During this time:   Take care of your body. Eat a healthy diet and get physical activity. Get as much sleep as you can.  Don't use alcohol or drugs to numb your feelings. This can slow the grieving process and be harmful to your health.  Don't spend a lot of time alone. Have daily contact with family or friends. Talk about your loss with those closest to you.  Take time for yourself. Make a point to do things that you enjoy.  If you have been prescribed a medicine to help with your symptoms, take it only as directed. Don't use it with alcohol.  Try to avoid making major decisions.  Accepting support    You will need support while you are grieving. That support can come from family, friends, and neighbors. Your healthcare provider may refer you to a  therapist or grief counselor. A grief support group can help. And this is the time many people reach out to their spiritual or Y[employer] community. During this process:   Stay in touch with family and friends, even if it’s hard to talk.  Stick to a daily routine that keeps you connected to friends and family.  Tell people how they can help. It can be as simple as bringing you a meal or walking your dog.  Meet with your tuyet leader, a counselor or therapist, or your own healthcare provider.  Consider joining a grief support group. Ask your healthcare provider how to find one in your area. Support groups can help you cope with your feelings and talk with others who have had a similar experience. These people can share what has helped them through their toughest moments. A support group can also help you with follow-up care. Grief can come back years later. It may be triggered by a memory or another pregnancy. Follow-up care can help you manage long-lasting effects of grief over months or years.   For other types of support, search online for “pregnancy loss support” to find resources like these:   Share: Pregnancy & Infant Loss Support at https://nationalshare.org/  [employer] at https://www.marchofdimes.org/find-support/topics/miscarriage-loss-grief/dealing-grief-after-death-your-baby  The Pregnancy Loss Support Program at https://www.pregnancyloss.org/  Saying goodbye  You may wish to have a memorial or  service for your child. This can also help family and friends understand the loss better. It can help give you and your family time to grieve together. Talk with the hospital staff if you wish to make  arrangements.   When to call the healthcare provider  There's no normal length of time to grieve. But if you feel unable to function, you may need extra help. Seeking help is not a sign of weakness. It means that you are taking charge of your recovery. Call your healthcare provider if you:   Can’t  eat or sleep for several days in a row  Have thoughts of harming yourself or others  Can’t work or take care of yourself or others as needed  Feel out of control with extreme sadness, fear, or anger  Family or friends have asked you to get help  Gain or lose a lot of weight  Feel your grief is getting worse, or not getting any better  Important  If you have thoughts of suicide or of harming yourself or others, call or text [employer] You will be connected to trained crisis counselors at the [employer] Suicide & Crisis Lifeline . An online chat option is also available at chat.[employer].org. You can also call [employer] at 800-273-talk (857.133.2969). [employer] is free and available 24/7.   Moving forward  At some point, you’ll begin thinking about the future. You’ll look ahead and make plans. To help yourself reach this point, try to do one thing each day to join in life. Keep at it, even if it feels strange at first. Your life may never be exactly the same. But one day you’ll find you’re living life fully again.   [employer] last reviewed this educational content on 9/1/2024  This information is for informational purposes only. This is not intended to be a substitute for professional medical advice, diagnosis, or treatment. Always seek the advice and follow the directions from your physician or other qualified health care provider.  © 5443-0872 [employer]. All rights reserved. This information is not intended as a substitute for professional medical care. Always follow your healthcare professional's instructions.

## 2025-07-19 ENCOUNTER — LAB ENCOUNTER (OUTPATIENT)
Dept: LAB | Age: 25
End: 2025-07-19
Attending: STUDENT IN AN ORGANIZED HEALTH CARE EDUCATION/TRAINING PROGRAM
Payer: COMMERCIAL

## 2025-07-19 DIAGNOSIS — O03.9 MISCARRIAGE (HCC): ICD-10-CM

## 2025-07-19 LAB — B-HCG SERPL-ACNC: 19.7 MIU/ML (ref ?–4.2)

## 2025-07-19 PROCEDURE — 36415 COLL VENOUS BLD VENIPUNCTURE: CPT

## 2025-07-19 PROCEDURE — 84702 CHORIONIC GONADOTROPIN TEST: CPT

## 2025-07-21 ENCOUNTER — PATIENT MESSAGE (OUTPATIENT)
Dept: OBGYN CLINIC | Facility: CLINIC | Age: 25
End: 2025-07-21

## 2025-07-21 DIAGNOSIS — O03.9 MISCARRIAGE (HCC): Primary | ICD-10-CM

## 2025-07-22 ENCOUNTER — RESULTS FOLLOW-UP (OUTPATIENT)
Dept: LAB | Facility: HOSPITAL | Age: 25
End: 2025-07-22

## 2025-07-22 DIAGNOSIS — N96 HISTORY OF RECURRENT MISCARRIAGES: Primary | ICD-10-CM

## 2025-07-22 LAB — Lab: NORMAL

## 2025-07-26 ENCOUNTER — LAB ENCOUNTER (OUTPATIENT)
Dept: LAB | Age: 25
End: 2025-07-26
Attending: STUDENT IN AN ORGANIZED HEALTH CARE EDUCATION/TRAINING PROGRAM
Payer: COMMERCIAL

## 2025-07-26 DIAGNOSIS — O03.9 MISCARRIAGE (HCC): ICD-10-CM

## 2025-07-26 DIAGNOSIS — N96 HISTORY OF RECURRENT MISCARRIAGES: ICD-10-CM

## 2025-07-26 LAB — B-HCG SERPL-ACNC: 11.7 MIU/ML (ref ?–4.2)

## 2025-07-26 PROCEDURE — 88262 CHROMOSOME ANALYSIS 15-20: CPT

## 2025-07-26 PROCEDURE — 84702 CHORIONIC GONADOTROPIN TEST: CPT

## 2025-07-26 PROCEDURE — 36415 COLL VENOUS BLD VENIPUNCTURE: CPT

## 2025-07-26 PROCEDURE — 88237 TISSUE CULTURE BONE MARROW: CPT

## 2025-07-28 DIAGNOSIS — O03.9 MISCARRIAGE (HCC): Primary | ICD-10-CM

## 2025-08-02 ENCOUNTER — LAB ENCOUNTER (OUTPATIENT)
Dept: LAB | Age: 25
End: 2025-08-02
Attending: STUDENT IN AN ORGANIZED HEALTH CARE EDUCATION/TRAINING PROGRAM

## 2025-08-02 DIAGNOSIS — O03.9 MISCARRIAGE (HCC): ICD-10-CM

## 2025-08-02 LAB — B-HCG SERPL-ACNC: 6.4 MIU/ML (ref ?–4.2)

## 2025-08-02 PROCEDURE — 36415 COLL VENOUS BLD VENIPUNCTURE: CPT

## 2025-08-02 PROCEDURE — 84702 CHORIONIC GONADOTROPIN TEST: CPT

## 2025-08-09 ENCOUNTER — OFFICE VISIT (OUTPATIENT)
Dept: FAMILY MEDICINE CLINIC | Facility: CLINIC | Age: 25
End: 2025-08-09

## 2025-08-09 VITALS
HEIGHT: 63 IN | WEIGHT: 135 LBS | SYSTOLIC BLOOD PRESSURE: 90 MMHG | HEART RATE: 81 BPM | BODY MASS INDEX: 23.92 KG/M2 | OXYGEN SATURATION: 98 % | DIASTOLIC BLOOD PRESSURE: 60 MMHG | RESPIRATION RATE: 16 BRPM

## 2025-08-09 DIAGNOSIS — K92.1 BLOOD IN STOOL: Primary | ICD-10-CM

## 2025-08-09 LAB
HEMOCCULT: NEGATIVE
POCT LOT NUMBER: NORMAL
PROCEDURE CONTROL: YES

## 2025-08-09 PROCEDURE — 99213 OFFICE O/P EST LOW 20 MIN: CPT | Performed by: EMERGENCY MEDICINE

## 2025-08-09 RX ORDER — CURCUMIN 100 %
POWDER (GRAM) MISCELLANEOUS
COMMUNITY

## 2025-08-18 LAB
CELLS ANALYZED BLDCHR: 20
CELLS COUNTED BLDCHR: 30
CELLS KARYOTYPE BLDCHR: 2
GTG BAND RES ACH BLDCHR: 550

## (undated) DIAGNOSIS — Z00.00 ROUTINE GENERAL MEDICAL EXAMINATION AT A HEALTH CARE FACILITY: Primary | ICD-10-CM

## (undated) DIAGNOSIS — L70.9 ACNE, UNSPECIFIED ACNE TYPE: Primary | ICD-10-CM

## (undated) NOTE — LETTER
Date: 10/29/2021    Patient Name: Mao Munoz          To Whom it may concern:    Due to a medical condition this patient should be excused from attending school 10/28/21.         Sincerely,    Arleth Browning MD

## (undated) NOTE — LETTER
Date: 4/26/2022    Patient Name: Yoselin Hassan          To Whom it may concern: The above patient was seen at the [facility] for treatment of a medical condition. The patient may return to work/school on 4/26/2022.         Sincerely,    PADDY Granado

## (undated) NOTE — LETTER
06/24/25      Lulú Pulliam is under my care. Please excuse her from work starting 6/24/2025. She is eligible to return to work starting Monday, June 30th without restrictions.    Thank you,     Christophe Clark MD

## (undated) NOTE — LETTER
Date & Time: 10/21/2021, 9:43 AM  Patient: Jia Barron  Encounter Provider(s):    Eugenie French MD       To Whom It May Concern:    Kaela Shore was seen and treated in our department on 10/21/2021.  She should not return to work until 10/2

## (undated) NOTE — LETTER
06/24/25    Lulú Pulliam is under my care. Please excuse her from work starting 6/26. She is eligible to return to work starting Monday, June 30th without restrictions.    Thank you,     Christophe Clark MD

## (undated) NOTE — LETTER
Date & Time: 10/31/2022, 12:52 PM  Patient: Lorene Chapman  Encounter Provider(s):    PADDY Barnard       To Whom It May Concern:    Thaddeus Peterson was seen and treated in our department on 10/31/2022. Should not return to school or clinicals until fever free for 24 hours. Patient does have influenza A.   If you have any questions or concerns, please do not hesitate to call.        _____________________________  Physician/APC Signature

## (undated) NOTE — LETTER
07/27/21    You will need to return to clinic in 48-72 hours to have results of TB test read. Please return to clinic on july 29th after 2:20 or on July 30th before 2:20 to have your TB test read.     If you do not return during this time your test will

## (undated) NOTE — LETTER
Date: 4/12/2021    Patient Name: Daphne Fields          To Whom it may concern: This letter has been written at the patient's request. The above patient was seen at the [facility] for treatment of a medical condition.  She is under inv

## (undated) NOTE — LETTER
Date: 7/29/2021    Patient Name: Radha Soliz          To Whom it may concern: The above patient had a negative skin TB test 7/27/21.          Sincerely,    ZORAN YANG

## (undated) NOTE — LETTER
25    Re: Lulú Pulliam  : 3/12/2000    To Whom It May Concern:    Lulú Pulliam is under my care. Please excuse her from work starting . She is eligible to return to work starting Monday,  without restrictions.  If you have any questions concerning this letter, please feel free to contact my office.      Sincerely yours,    Christophe Clark MD

## (undated) NOTE — LETTER
Date: 10/7/2021    Patient Name: Sharon Cage          To Whom it may concern: This letter has been written at the patient's request. The above patient was seen at the [facility] for treatment of a medical condition.     This patient

## (undated) NOTE — LETTER
June 21, 2025    Patient: Lulú Pulliam   Date of Visit: 6/21/2025       To Whom It May Concern:    Lulú Pulliam was seen and treated in our emergency department on 6/21/2025. She should not return to work until okayed by obstetrician.    If you have any questions or concerns, please don't hesitate to call.       Encounter Provider(s):    Lissette Hannon MD

## (undated) NOTE — LETTER
25    Re: Lluú Pulliam  : 3/12/2000    To Whom It May Concern:    Lulú Pulliam is currently under my care and had an office visit with me today, 25.     If you have any questions concerning this letter, please feel free to contact my office.      Sincerely yours,      Christophe Clark MD

## (undated) NOTE — LETTER
Date: 11/3/2021    Patient Name: Suraj Monahan          To Whom it may concern: The above patient was seen at the [facility] for treatment of a medical condition. This patient should be excused from attending work/school 11/3/2021.

## (undated) NOTE — LETTER
Date: 10/29/2021    Patient Name: Neha Callahan          To Whom it may concern:    Due to a medical condition this patient should be excused from attending school 10/29/21.             Sincerely,    Leticia Villasenor MD